# Patient Record
Sex: FEMALE | Race: WHITE | NOT HISPANIC OR LATINO
[De-identification: names, ages, dates, MRNs, and addresses within clinical notes are randomized per-mention and may not be internally consistent; named-entity substitution may affect disease eponyms.]

---

## 2017-01-23 PROBLEM — Z00.00 ENCOUNTER FOR PREVENTIVE HEALTH EXAMINATION: Status: ACTIVE | Noted: 2017-01-23

## 2017-02-02 ENCOUNTER — APPOINTMENT (OUTPATIENT)
Dept: PULMONOLOGY | Facility: CLINIC | Age: 42
End: 2017-02-02

## 2017-05-15 ENCOUNTER — APPOINTMENT (OUTPATIENT)
Dept: PULMONOLOGY | Facility: CLINIC | Age: 42
End: 2017-05-15

## 2017-09-26 ENCOUNTER — APPOINTMENT (OUTPATIENT)
Dept: PULMONOLOGY | Facility: CLINIC | Age: 42
End: 2017-09-26

## 2017-11-06 ENCOUNTER — EMERGENCY (EMERGENCY)
Facility: HOSPITAL | Age: 42
LOS: 1 days | Discharge: HOME | End: 2017-11-06
Attending: EMERGENCY MEDICINE
Payer: SELF-PAY

## 2017-11-06 VITALS
OXYGEN SATURATION: 98 % | SYSTOLIC BLOOD PRESSURE: 119 MMHG | DIASTOLIC BLOOD PRESSURE: 64 MMHG | HEART RATE: 73 BPM | TEMPERATURE: 98 F | RESPIRATION RATE: 18 BRPM

## 2017-11-06 VITALS — WEIGHT: 134.04 LBS | HEIGHT: 68 IN

## 2017-11-06 PROCEDURE — 73590 X-RAY EXAM OF LOWER LEG: CPT | Mod: 26,50

## 2017-11-06 PROCEDURE — 73564 X-RAY EXAM KNEE 4 OR MORE: CPT | Mod: 26,RT

## 2017-11-06 PROCEDURE — 73590 X-RAY EXAM OF LOWER LEG: CPT

## 2017-11-06 PROCEDURE — 99284 EMERGENCY DEPT VISIT MOD MDM: CPT | Mod: 25

## 2017-11-06 PROCEDURE — 73564 X-RAY EXAM KNEE 4 OR MORE: CPT

## 2017-11-06 PROCEDURE — 99283 EMERGENCY DEPT VISIT LOW MDM: CPT

## 2017-11-06 NOTE — ED PROVIDER NOTE - OBJECTIVE STATEMENT
42 year-old female, no significant PMHx, presents with cc right arm and left calf pain and bilateral knee pain. 42 year-old female, no significant PMHx, presents with cc right arm and left calf pain and bilateral knee pain. While at work, tripped and fell forward, breaking fall with both arms and landing on both knees. Now c/o right wrist pain, bilateral knee pain and left calf pain, aching, moderate, no alleviating factors. Denies head injury, LOC, neck/back pain, numbness/tingling, weakness, dizziness or any other complaints. Not taking any blood thinners.

## 2017-11-06 NOTE — ED PROVIDER NOTE - PROGRESS NOTE DETAILS
Xrays show no fracture or dislocation. Neurovascularly intact. Advised to take IBU and follow up with PMD in 1-2 days. Pt is well appearing walking with steady gait, stable for discharge and follow up without fail with medical doctor. I had a detailed discussion with the patient and/or guardian regarding the historical points, exam findings, and any diagnostic results supporting the discharge diagnosis. Pt educated on care and need for follow up. Strict return instructions and red flag signs and symptoms discussed with patient. Questions answered. Pt shows understanding of discharge information and agrees to follow.

## 2017-11-06 NOTE — ED PROVIDER NOTE - ATTENDING CONTRIBUTION TO CARE
Attending MD Miranda:   I personally have seen and examined this patient.  Physician assistant note reviewed and agree on plan of care and except where noted.  See MDM for details.

## 2017-11-06 NOTE — ED PROVIDER NOTE - PHYSICAL EXAMINATION
Neck full range of motion. No spinal/paraspinal tenderness to palpation. Right wrist full range of motion without bony tenderness. Neghative snuffbox. Cap. refill < 2 sec. Radial/ulnar pulses intact 2+ bilaterally. LE exam: DP/PT pulses intact 2+. Bilateral knee, hip and ankle full range of motion. No swelling or bony tenderness. Mild left calf tightness and tenderness.

## 2017-11-06 NOTE — ED ADULT NURSE NOTE - OBJECTIVE STATEMENT
AOX3 +ambulatory patient reports L leg and and right arm pain s/p fall at work. Patient denies any LOC

## 2017-11-06 NOTE — ED PROVIDER NOTE - MEDICAL DECISION MAKING DETAILS
42 year-old female, presents for evaluation s/p mechanical fall today. Well-appearing, neurovascularly intact. Plan: xray, re-assess with likely dc.

## 2017-11-20 ENCOUNTER — INPATIENT (INPATIENT)
Facility: HOSPITAL | Age: 42
LOS: 4 days | Discharge: HOME | DRG: 164 | End: 2017-11-25
Attending: STUDENT IN AN ORGANIZED HEALTH CARE EDUCATION/TRAINING PROGRAM | Admitting: STUDENT IN AN ORGANIZED HEALTH CARE EDUCATION/TRAINING PROGRAM
Payer: COMMERCIAL

## 2017-11-20 VITALS
TEMPERATURE: 98 F | SYSTOLIC BLOOD PRESSURE: 130 MMHG | DIASTOLIC BLOOD PRESSURE: 73 MMHG | HEART RATE: 82 BPM | RESPIRATION RATE: 22 BRPM | OXYGEN SATURATION: 96 %

## 2017-11-20 DIAGNOSIS — J93.9 PNEUMOTHORAX, UNSPECIFIED: ICD-10-CM

## 2017-11-20 LAB
ALBUMIN SERPL ELPH-MCNC: 4.2 G/DL — SIGNIFICANT CHANGE UP (ref 3.3–5)
ALP SERPL-CCNC: 70 U/L — SIGNIFICANT CHANGE UP (ref 40–120)
ALT FLD-CCNC: 20 U/L RC — SIGNIFICANT CHANGE UP (ref 10–45)
ANION GAP SERPL CALC-SCNC: 13 MMOL/L — SIGNIFICANT CHANGE UP (ref 5–17)
APTT BLD: 29.2 SEC — SIGNIFICANT CHANGE UP (ref 27.5–37.4)
AST SERPL-CCNC: 21 U/L — SIGNIFICANT CHANGE UP (ref 10–40)
BASOPHILS # BLD AUTO: 0.1 K/UL — SIGNIFICANT CHANGE UP (ref 0–0.2)
BASOPHILS NFR BLD AUTO: 1 % — SIGNIFICANT CHANGE UP (ref 0–2)
BILIRUB SERPL-MCNC: 0.2 MG/DL — SIGNIFICANT CHANGE UP (ref 0.2–1.2)
BUN SERPL-MCNC: 21 MG/DL — SIGNIFICANT CHANGE UP (ref 7–23)
CALCIUM SERPL-MCNC: 8.7 MG/DL — SIGNIFICANT CHANGE UP (ref 8.4–10.5)
CHLORIDE SERPL-SCNC: 105 MMOL/L — SIGNIFICANT CHANGE UP (ref 96–108)
CO2 SERPL-SCNC: 25 MMOL/L — SIGNIFICANT CHANGE UP (ref 22–31)
CREAT SERPL-MCNC: 0.65 MG/DL — SIGNIFICANT CHANGE UP (ref 0.5–1.3)
EOSINOPHIL # BLD AUTO: 0.1 K/UL — SIGNIFICANT CHANGE UP (ref 0–0.5)
EOSINOPHIL NFR BLD AUTO: 1.8 % — SIGNIFICANT CHANGE UP (ref 0–6)
GLUCOSE SERPL-MCNC: 85 MG/DL — SIGNIFICANT CHANGE UP (ref 70–99)
HCT VFR BLD CALC: 40.5 % — SIGNIFICANT CHANGE UP (ref 34.5–45)
HGB BLD-MCNC: 13.6 G/DL — SIGNIFICANT CHANGE UP (ref 11.5–15.5)
INR BLD: 0.91 RATIO — SIGNIFICANT CHANGE UP (ref 0.88–1.16)
LYMPHOCYTES # BLD AUTO: 2.3 K/UL — SIGNIFICANT CHANGE UP (ref 1–3.3)
LYMPHOCYTES # BLD AUTO: 40.5 % — SIGNIFICANT CHANGE UP (ref 13–44)
MACROCYTES BLD QL: SIGNIFICANT CHANGE UP
MCHC RBC-ENTMCNC: 33.6 GM/DL — SIGNIFICANT CHANGE UP (ref 32–36)
MCHC RBC-ENTMCNC: 35.5 PG — HIGH (ref 27–34)
MCV RBC AUTO: 106 FL — HIGH (ref 80–100)
MONOCYTES # BLD AUTO: 0.4 K/UL — SIGNIFICANT CHANGE UP (ref 0–0.9)
MONOCYTES NFR BLD AUTO: 6.5 % — SIGNIFICANT CHANGE UP (ref 2–14)
NEUTROPHILS # BLD AUTO: 2.8 K/UL — SIGNIFICANT CHANGE UP (ref 1.8–7.4)
NEUTROPHILS NFR BLD AUTO: 50.2 % — SIGNIFICANT CHANGE UP (ref 43–77)
PLAT MORPH BLD: NORMAL — SIGNIFICANT CHANGE UP
PLATELET # BLD AUTO: 181 K/UL — SIGNIFICANT CHANGE UP (ref 150–400)
POTASSIUM SERPL-MCNC: 4.3 MMOL/L — SIGNIFICANT CHANGE UP (ref 3.5–5.3)
POTASSIUM SERPL-SCNC: 4.3 MMOL/L — SIGNIFICANT CHANGE UP (ref 3.5–5.3)
PROT SERPL-MCNC: 6.3 G/DL — SIGNIFICANT CHANGE UP (ref 6–8.3)
PROTHROM AB SERPL-ACNC: 9.9 SEC — SIGNIFICANT CHANGE UP (ref 9.8–12.7)
RBC # BLD: 3.83 M/UL — SIGNIFICANT CHANGE UP (ref 3.8–5.2)
RBC # FLD: 11.3 % — SIGNIFICANT CHANGE UP (ref 10.3–14.5)
RBC BLD AUTO: ABNORMAL
SODIUM SERPL-SCNC: 143 MMOL/L — SIGNIFICANT CHANGE UP (ref 135–145)
WBC # BLD: 5.7 K/UL — SIGNIFICANT CHANGE UP (ref 3.8–10.5)
WBC # FLD AUTO: 5.7 K/UL — SIGNIFICANT CHANGE UP (ref 3.8–10.5)

## 2017-11-20 PROCEDURE — 99285 EMERGENCY DEPT VISIT HI MDM: CPT

## 2017-11-20 PROCEDURE — 99223 1ST HOSP IP/OBS HIGH 75: CPT | Mod: 57

## 2017-11-20 PROCEDURE — 71010: CPT | Mod: 26

## 2017-11-20 PROCEDURE — 71250 CT THORAX DX C-: CPT | Mod: 26

## 2017-11-20 NOTE — H&P ADULT - HISTORY OF PRESENT ILLNESS
42yF w/ PMH sig for emphysema (managed by her pulmonologist, Dr. Emma Houston) and prior spontaneous R PTX presented to [facility] ED on 11/20/2017 c/o 3 days of CP and SoB. She initially went to [facility] in [place] and had CXR that could not r/o PTX, so she was advised to go to ED for evaluation. Pt states she had her first PTX February 2016 that was treated w/ just nasal canula and did not have a chest tube placed. She states she had similar symptoms 2 other times in the past year, but did not go to ED for evaluation and treatment. She had spontaneous resolution of symptoms after approximately 1 week. She states that she feels a discomfort in her chest just lateral to her sternum that radiates to her back and is associated w/ SoB. This is how she feels every time she has a PTX.

## 2017-11-20 NOTE — H&P ADULT - ATTENDING COMMENTS
patient seen - reports previous episode of pneumothorax in right chest. she reports similar feeling two other times however she did not seek medical treatment those times. She does not recall any relation to her menses. She is a current smoker. Discussed with her blebs present on ct scan - we will resect these if feasible. we will also talc pleurodese her. Patient agrees - consent in chart. awaiting pregnancy test, right vats/blebectomy/pleurodesis

## 2017-11-20 NOTE — H&P ADULT - ASSESSMENT
42yF w/ spontaneous R PTX    - Admit to Thoracic surgery service, Dr. Brantley  - Possible add on for R VATS t/m  - NPOpMN in anticipation of OR  - Repeat CXR in AM to assess evolution of PTX  - Nasal canula and continuous pulse ox monitoring  - Plan discussed w/ Dr. Brantley  - Please call [phone] w/ any questions    FLEX Khan PGY-2

## 2017-11-20 NOTE — H&P ADULT - NSHPREVIEWOFSYSTEMS_GEN_ALL_CORE
She denies fevers, chills, weakness, numbness, tingling, palpitations, nausea, vomiting, diarrhea, constipation, dysuria, or hematuria. See HPI for positive ROS.

## 2017-11-20 NOTE — ED ADULT NURSE NOTE - CHPI ED SYMPTOMS NEG
no vomiting/no dizziness/no chills/no nausea/no weakness/no fever/no numbness/no tingling/no decreased eating/drinking

## 2017-11-20 NOTE — ED PROVIDER NOTE - MEDICAL DECISION MAKING DETAILS
41 y/o F pt with PMHx of multiple pneumothorax, emphysema, current smoker sent in by urgent care to r/o pneumothorax. Pt notes SOB and back pain after bending over. Plan: CXR, CT to evaluate for pneumothorax.

## 2017-11-20 NOTE — ED ADULT NURSE NOTE - CHIEF COMPLAINT QUOTE
hx of collapsed lung right lung collapsed after back injury happened Friday night sent in for further evaluation for sees something in the xray

## 2017-11-20 NOTE — ED ADULT NURSE NOTE - OBJECTIVE STATEMENT
42 year old female presented to ED with c/o of middle back pain. Pt was cleaning floors on Friday and when getting up had sharp middle back pain. Pt went to PMD and x-ray showed pneumothorax. Pt denies CP, SOB, nausea/vomiting, numbness/tingling, fever, cough, chills, dizziness, headache. Pt a&ox3, lung sounds clear bilaterally, heart rate regular, abdomen soft nontender nondistended to palp. Skin intact. IV in left AC 20G and patent. Pt currently resting in bed with family at bedside, side rails up for safety. Will continue to monitor and assess while offering support and reassurance.

## 2017-11-20 NOTE — H&P ADULT - NSHPPHYSICALEXAM_GEN_ALL_CORE
Gen: WD, WN, NAD  HEENT: PERRLA, EOMI, Oropharynx clear  Neck: Supple, no JVD, No thyromegaly  Lungs: Diminished breath sounds over R base  Heart: RRR, S1 S2, No m/r/g  Abd: Soft, ND, NT, No HSM, No rebound or guarding  Ext: WWP, No clubbing, cyanosis, or edema  Neuro: AAOx3, CN II-XII grossly intact, No focal deficits

## 2017-11-20 NOTE — H&P ADULT - NSHPLABSRESULTS_GEN_ALL_CORE
LABS:  CBC (11-20 @ 20:18)                              13.6                           5.7     )----------------(  181        50.2  % Neutrophils, 40.5  % Lymphocytes, ANC: 2.8                                 40.5                  BMP (11-20 @ 20:18)             143     |  105     |  21    		Ca++ --      Ca 8.7                ---------------------------------( 85    		Mg --                 4.3     |  25      |  0.65  			Ph --        LFTs (11-20 @ 20:18)      TPro 6.3 / Alb 4.2 / TBili 0.2 / DBili -- / AST 21 / ALT 20 / AlkPhos 70    Coags (11-20 @ 20:33)  aPTT 29.2 / INR 0.91 / PT 9.9        IMAGING:  < from: CT Chest No Cont (11.20.17 @ 20:13) >    IMPRESSION: Moderate right hydropneumothorax.    Emphysema with 3 mm right upper lobe nodule. One-year follow-up CT needed   for complete evaluation.    < end of copied text >

## 2017-11-20 NOTE — ED ADULT TRIAGE NOTE - CHIEF COMPLAINT QUOTE
hx of collapsed lung right lung collapsed after back injury happened Friday night today xray and lent in

## 2017-11-20 NOTE — ED PROVIDER NOTE - OBJECTIVE STATEMENT
41 y/o F pt with PMHx of emphysema sent in for further evaluation. Notes back pain and SOB s/p bending over. Pt took muscle relaxers for the pain with no relief. Pt went to urgent care today and completed XR. Pt was advised to go to the ED for pneumothorax r/o. Pt went to [facility] walk [place] [address]. Notes a pneumothorax could not be excluded on the XR.  States that she had a hx of pneumothorax last month and a few months ago as well. Pt currently follows up with pulmonologist, last visit was 2 months ago. Pt is trying to quit smoking cigarettes.

## 2017-11-21 ENCOUNTER — RESULT REVIEW (OUTPATIENT)
Age: 42
End: 2017-11-21

## 2017-11-21 ENCOUNTER — TRANSCRIPTION ENCOUNTER (OUTPATIENT)
Age: 42
End: 2017-11-21

## 2017-11-21 LAB
ANION GAP SERPL CALC-SCNC: 13 MMOL/L — SIGNIFICANT CHANGE UP (ref 5–17)
BLD GP AB SCN SERPL QL: NEGATIVE — SIGNIFICANT CHANGE UP
BUN SERPL-MCNC: 15 MG/DL — SIGNIFICANT CHANGE UP (ref 7–23)
CALCIUM SERPL-MCNC: 8.3 MG/DL — LOW (ref 8.4–10.5)
CHLORIDE SERPL-SCNC: 104 MMOL/L — SIGNIFICANT CHANGE UP (ref 96–108)
CO2 SERPL-SCNC: 23 MMOL/L — SIGNIFICANT CHANGE UP (ref 22–31)
CREAT SERPL-MCNC: 0.55 MG/DL — SIGNIFICANT CHANGE UP (ref 0.5–1.3)
GLUCOSE SERPL-MCNC: 79 MG/DL — SIGNIFICANT CHANGE UP (ref 70–99)
HCG UR QL: NEGATIVE — SIGNIFICANT CHANGE UP
HCT VFR BLD CALC: 37.8 % — SIGNIFICANT CHANGE UP (ref 34.5–45)
HGB BLD-MCNC: 12.9 G/DL — SIGNIFICANT CHANGE UP (ref 11.5–15.5)
MCHC RBC-ENTMCNC: 34 PG — SIGNIFICANT CHANGE UP (ref 27–34)
MCHC RBC-ENTMCNC: 34.1 GM/DL — SIGNIFICANT CHANGE UP (ref 32–36)
MCV RBC AUTO: 99.7 FL — SIGNIFICANT CHANGE UP (ref 80–100)
PLATELET # BLD AUTO: 182 K/UL — SIGNIFICANT CHANGE UP (ref 150–400)
POTASSIUM SERPL-MCNC: 4.2 MMOL/L — SIGNIFICANT CHANGE UP (ref 3.5–5.3)
POTASSIUM SERPL-SCNC: 4.2 MMOL/L — SIGNIFICANT CHANGE UP (ref 3.5–5.3)
RBC # BLD: 3.79 M/UL — LOW (ref 3.8–5.2)
RBC # FLD: 12.7 % — SIGNIFICANT CHANGE UP (ref 10.3–14.5)
RH IG SCN BLD-IMP: POSITIVE — SIGNIFICANT CHANGE UP
RH IG SCN BLD-IMP: POSITIVE — SIGNIFICANT CHANGE UP
SODIUM SERPL-SCNC: 140 MMOL/L — SIGNIFICANT CHANGE UP (ref 135–145)
WBC # BLD: 4.58 K/UL — SIGNIFICANT CHANGE UP (ref 3.8–10.5)
WBC # FLD AUTO: 4.58 K/UL — SIGNIFICANT CHANGE UP (ref 3.8–10.5)

## 2017-11-21 PROCEDURE — 32650 THORACOSCOPY W/PLEURODESIS: CPT | Mod: RT

## 2017-11-21 PROCEDURE — 32655 THORACOSCOPY RESECT BULLAE: CPT | Mod: RT

## 2017-11-21 PROCEDURE — 88307 TISSUE EXAM BY PATHOLOGIST: CPT | Mod: 26

## 2017-11-21 PROCEDURE — 71010: CPT | Mod: 26

## 2017-11-21 RX ORDER — HYDROMORPHONE HYDROCHLORIDE 2 MG/ML
0.5 INJECTION INTRAMUSCULAR; INTRAVENOUS; SUBCUTANEOUS
Qty: 0 | Refills: 0 | Status: DISCONTINUED | OUTPATIENT
Start: 2017-11-21 | End: 2017-11-22

## 2017-11-21 RX ORDER — HEPARIN SODIUM 5000 [USP'U]/ML
5000 INJECTION INTRAVENOUS; SUBCUTANEOUS EVERY 8 HOURS
Qty: 0 | Refills: 0 | Status: DISCONTINUED | OUTPATIENT
Start: 2017-11-21 | End: 2017-11-25

## 2017-11-21 RX ORDER — HYDROMORPHONE HYDROCHLORIDE 2 MG/ML
0.5 INJECTION INTRAMUSCULAR; INTRAVENOUS; SUBCUTANEOUS EVERY 4 HOURS
Qty: 0 | Refills: 0 | Status: DISCONTINUED | OUTPATIENT
Start: 2017-11-21 | End: 2017-11-21

## 2017-11-21 RX ORDER — BUDESONIDE AND FORMOTEROL FUMARATE DIHYDRATE 160; 4.5 UG/1; UG/1
2 AEROSOL RESPIRATORY (INHALATION)
Qty: 0 | Refills: 0 | Status: DISCONTINUED | OUTPATIENT
Start: 2017-11-21 | End: 2017-11-21

## 2017-11-21 RX ORDER — CEFAZOLIN SODIUM 1 G
2000 VIAL (EA) INJECTION EVERY 8 HOURS
Qty: 0 | Refills: 0 | Status: COMPLETED | OUTPATIENT
Start: 2017-11-21 | End: 2017-11-22

## 2017-11-21 RX ORDER — HYDROMORPHONE HYDROCHLORIDE 2 MG/ML
30 INJECTION INTRAMUSCULAR; INTRAVENOUS; SUBCUTANEOUS
Qty: 0 | Refills: 0 | Status: DISCONTINUED | OUTPATIENT
Start: 2017-11-21 | End: 2017-11-23

## 2017-11-21 RX ORDER — HYDROMORPHONE HYDROCHLORIDE 2 MG/ML
0.5 INJECTION INTRAMUSCULAR; INTRAVENOUS; SUBCUTANEOUS
Qty: 0 | Refills: 0 | Status: DISCONTINUED | OUTPATIENT
Start: 2017-11-21 | End: 2017-11-23

## 2017-11-21 RX ORDER — ONDANSETRON 8 MG/1
4 TABLET, FILM COATED ORAL EVERY 6 HOURS
Qty: 0 | Refills: 0 | Status: DISCONTINUED | OUTPATIENT
Start: 2017-11-21 | End: 2017-11-25

## 2017-11-21 RX ORDER — SODIUM CHLORIDE 9 MG/ML
1000 INJECTION, SOLUTION INTRAVENOUS
Qty: 0 | Refills: 0 | Status: DISCONTINUED | OUTPATIENT
Start: 2017-11-21 | End: 2017-11-21

## 2017-11-21 RX ORDER — ONDANSETRON 8 MG/1
4 TABLET, FILM COATED ORAL EVERY 6 HOURS
Qty: 0 | Refills: 0 | Status: DISCONTINUED | OUTPATIENT
Start: 2017-11-21 | End: 2017-11-21

## 2017-11-21 RX ORDER — ONDANSETRON 8 MG/1
4 TABLET, FILM COATED ORAL EVERY 6 HOURS
Qty: 0 | Refills: 0 | Status: DISCONTINUED | OUTPATIENT
Start: 2017-11-21 | End: 2017-11-22

## 2017-11-21 RX ORDER — DEXTROSE MONOHYDRATE, SODIUM CHLORIDE, AND POTASSIUM CHLORIDE 50; .745; 4.5 G/1000ML; G/1000ML; G/1000ML
1000 INJECTION, SOLUTION INTRAVENOUS
Qty: 0 | Refills: 0 | Status: DISCONTINUED | OUTPATIENT
Start: 2017-11-21 | End: 2017-11-22

## 2017-11-21 RX ORDER — ACETAMINOPHEN 500 MG
650 TABLET ORAL EVERY 6 HOURS
Qty: 0 | Refills: 0 | Status: DISCONTINUED | OUTPATIENT
Start: 2017-11-21 | End: 2017-11-23

## 2017-11-21 RX ORDER — NALOXONE HYDROCHLORIDE 4 MG/.1ML
0.1 SPRAY NASAL
Qty: 0 | Refills: 0 | Status: DISCONTINUED | OUTPATIENT
Start: 2017-11-21 | End: 2017-11-23

## 2017-11-21 RX ORDER — ACETAMINOPHEN 500 MG
650 TABLET ORAL EVERY 6 HOURS
Qty: 0 | Refills: 0 | Status: DISCONTINUED | OUTPATIENT
Start: 2017-11-21 | End: 2017-11-25

## 2017-11-21 RX ORDER — HEPARIN SODIUM 5000 [USP'U]/ML
5000 INJECTION INTRAVENOUS; SUBCUTANEOUS EVERY 8 HOURS
Qty: 0 | Refills: 0 | Status: DISCONTINUED | OUTPATIENT
Start: 2017-11-21 | End: 2017-11-21

## 2017-11-21 RX ORDER — ALBUTEROL 90 UG/1
2 AEROSOL, METERED ORAL EVERY 6 HOURS
Qty: 0 | Refills: 0 | Status: DISCONTINUED | OUTPATIENT
Start: 2017-11-21 | End: 2017-11-21

## 2017-11-21 RX ADMIN — HYDROMORPHONE HYDROCHLORIDE 30 MILLILITER(S): 2 INJECTION INTRAMUSCULAR; INTRAVENOUS; SUBCUTANEOUS at 15:58

## 2017-11-21 RX ADMIN — HYDROMORPHONE HYDROCHLORIDE 0.5 MILLIGRAM(S): 2 INJECTION INTRAMUSCULAR; INTRAVENOUS; SUBCUTANEOUS at 13:49

## 2017-11-21 RX ADMIN — HYDROMORPHONE HYDROCHLORIDE 0.5 MILLIGRAM(S): 2 INJECTION INTRAMUSCULAR; INTRAVENOUS; SUBCUTANEOUS at 14:05

## 2017-11-21 RX ADMIN — HYDROMORPHONE HYDROCHLORIDE 30 MILLILITER(S): 2 INJECTION INTRAMUSCULAR; INTRAVENOUS; SUBCUTANEOUS at 14:10

## 2017-11-21 RX ADMIN — SODIUM CHLORIDE 75 MILLILITER(S): 9 INJECTION, SOLUTION INTRAVENOUS at 03:46

## 2017-11-21 RX ADMIN — Medication 100 MILLIGRAM(S): at 18:47

## 2017-11-21 RX ADMIN — HYDROMORPHONE HYDROCHLORIDE 30 MILLILITER(S): 2 INJECTION INTRAMUSCULAR; INTRAVENOUS; SUBCUTANEOUS at 23:54

## 2017-11-21 RX ADMIN — HEPARIN SODIUM 5000 UNIT(S): 5000 INJECTION INTRAVENOUS; SUBCUTANEOUS at 15:05

## 2017-11-21 RX ADMIN — HEPARIN SODIUM 5000 UNIT(S): 5000 INJECTION INTRAVENOUS; SUBCUTANEOUS at 21:56

## 2017-11-21 RX ADMIN — BUDESONIDE AND FORMOTEROL FUMARATE DIHYDRATE 2 PUFF(S): 160; 4.5 AEROSOL RESPIRATORY (INHALATION) at 06:39

## 2017-11-21 RX ADMIN — HEPARIN SODIUM 5000 UNIT(S): 5000 INJECTION INTRAVENOUS; SUBCUTANEOUS at 06:05

## 2017-11-21 NOTE — PROVIDER CONTACT NOTE (OTHER) - ACTION/TREATMENT ORDERED:
MD stated that pt has to have surgery but is not on the schedule as yet and consents for OR were not signed, so MD stated she can get the hep shot this am- same given

## 2017-11-21 NOTE — PROVIDER CONTACT NOTE (OTHER) - ASSESSMENT
Patient has no diet order and is asking for food, as per patient she may be going to the OR in the AM.

## 2017-11-21 NOTE — BRIEF OPERATIVE NOTE - PROCEDURE
<<-----Click on this checkbox to enter Procedure Blebectomy, thoracoscopic, with pleurodesis  11/21/2017    Active  I5

## 2017-11-22 DIAGNOSIS — J93.9 PNEUMOTHORAX, UNSPECIFIED: ICD-10-CM

## 2017-11-22 LAB
ANION GAP SERPL CALC-SCNC: 10 MMOL/L — SIGNIFICANT CHANGE UP (ref 5–17)
BUN SERPL-MCNC: 12 MG/DL — SIGNIFICANT CHANGE UP (ref 7–23)
CALCIUM SERPL-MCNC: 7.6 MG/DL — LOW (ref 8.4–10.5)
CHLORIDE SERPL-SCNC: 105 MMOL/L — SIGNIFICANT CHANGE UP (ref 96–108)
CO2 SERPL-SCNC: 26 MMOL/L — SIGNIFICANT CHANGE UP (ref 22–31)
CREAT SERPL-MCNC: 0.59 MG/DL — SIGNIFICANT CHANGE UP (ref 0.5–1.3)
GLUCOSE SERPL-MCNC: 97 MG/DL — SIGNIFICANT CHANGE UP (ref 70–99)
HCT VFR BLD CALC: 37.9 % — SIGNIFICANT CHANGE UP (ref 34.5–45)
HGB BLD-MCNC: 12.7 G/DL — SIGNIFICANT CHANGE UP (ref 11.5–15.5)
MCHC RBC-ENTMCNC: 33.4 GM/DL — SIGNIFICANT CHANGE UP (ref 32–36)
MCHC RBC-ENTMCNC: 35.4 PG — HIGH (ref 27–34)
MCV RBC AUTO: 106 FL — HIGH (ref 80–100)
PLATELET # BLD AUTO: 166 K/UL — SIGNIFICANT CHANGE UP (ref 150–400)
POTASSIUM SERPL-MCNC: 5.2 MMOL/L — SIGNIFICANT CHANGE UP (ref 3.5–5.3)
POTASSIUM SERPL-SCNC: 5.2 MMOL/L — SIGNIFICANT CHANGE UP (ref 3.5–5.3)
RBC # BLD: 3.57 M/UL — LOW (ref 3.8–5.2)
RBC # FLD: 11.3 % — SIGNIFICANT CHANGE UP (ref 10.3–14.5)
SODIUM SERPL-SCNC: 141 MMOL/L — SIGNIFICANT CHANGE UP (ref 135–145)
WBC # BLD: 8.2 K/UL — SIGNIFICANT CHANGE UP (ref 3.8–10.5)
WBC # FLD AUTO: 8.2 K/UL — SIGNIFICANT CHANGE UP (ref 3.8–10.5)

## 2017-11-22 PROCEDURE — 71010: CPT | Mod: 26

## 2017-11-22 RX ORDER — DOCUSATE SODIUM 100 MG
100 CAPSULE ORAL THREE TIMES A DAY
Qty: 0 | Refills: 0 | Status: DISCONTINUED | OUTPATIENT
Start: 2017-11-22 | End: 2017-11-25

## 2017-11-22 RX ORDER — SODIUM CHLORIDE 9 MG/ML
1000 INJECTION INTRAMUSCULAR; INTRAVENOUS; SUBCUTANEOUS
Qty: 0 | Refills: 0 | Status: DISCONTINUED | OUTPATIENT
Start: 2017-11-22 | End: 2017-11-24

## 2017-11-22 RX ORDER — ACETAMINOPHEN 500 MG
1000 TABLET ORAL ONCE
Qty: 0 | Refills: 0 | Status: COMPLETED | OUTPATIENT
Start: 2017-11-22 | End: 2017-11-22

## 2017-11-22 RX ORDER — SENNA PLUS 8.6 MG/1
2 TABLET ORAL AT BEDTIME
Qty: 0 | Refills: 0 | Status: DISCONTINUED | OUTPATIENT
Start: 2017-11-22 | End: 2017-11-25

## 2017-11-22 RX ORDER — IPRATROPIUM/ALBUTEROL SULFATE 18-103MCG
3 AEROSOL WITH ADAPTER (GRAM) INHALATION EVERY 6 HOURS
Qty: 0 | Refills: 0 | Status: DISCONTINUED | OUTPATIENT
Start: 2017-11-22 | End: 2017-11-25

## 2017-11-22 RX ADMIN — Medication 400 MILLIGRAM(S): at 08:16

## 2017-11-22 RX ADMIN — Medication 100 MILLIGRAM(S): at 13:56

## 2017-11-22 RX ADMIN — Medication 3 MILLILITER(S): at 07:30

## 2017-11-22 RX ADMIN — Medication 100 MILLIGRAM(S): at 03:00

## 2017-11-22 RX ADMIN — HYDROMORPHONE HYDROCHLORIDE 30 MILLILITER(S): 2 INJECTION INTRAMUSCULAR; INTRAVENOUS; SUBCUTANEOUS at 19:20

## 2017-11-22 RX ADMIN — ONDANSETRON 4 MILLIGRAM(S): 8 TABLET, FILM COATED ORAL at 07:44

## 2017-11-22 RX ADMIN — SODIUM CHLORIDE 30 MILLILITER(S): 9 INJECTION INTRAMUSCULAR; INTRAVENOUS; SUBCUTANEOUS at 08:16

## 2017-11-22 RX ADMIN — HYDROMORPHONE HYDROCHLORIDE 30 MILLILITER(S): 2 INJECTION INTRAMUSCULAR; INTRAVENOUS; SUBCUTANEOUS at 14:37

## 2017-11-22 RX ADMIN — HEPARIN SODIUM 5000 UNIT(S): 5000 INJECTION INTRAVENOUS; SUBCUTANEOUS at 13:56

## 2017-11-22 RX ADMIN — HYDROMORPHONE HYDROCHLORIDE 30 MILLILITER(S): 2 INJECTION INTRAMUSCULAR; INTRAVENOUS; SUBCUTANEOUS at 08:16

## 2017-11-22 RX ADMIN — HEPARIN SODIUM 5000 UNIT(S): 5000 INJECTION INTRAVENOUS; SUBCUTANEOUS at 22:04

## 2017-11-22 RX ADMIN — HEPARIN SODIUM 5000 UNIT(S): 5000 INJECTION INTRAVENOUS; SUBCUTANEOUS at 05:51

## 2017-11-22 RX ADMIN — Medication 3 MILLILITER(S): at 17:27

## 2017-11-22 RX ADMIN — HYDROMORPHONE HYDROCHLORIDE 30 MILLILITER(S): 2 INJECTION INTRAMUSCULAR; INTRAVENOUS; SUBCUTANEOUS at 12:42

## 2017-11-22 RX ADMIN — HYDROMORPHONE HYDROCHLORIDE 30 MILLILITER(S): 2 INJECTION INTRAMUSCULAR; INTRAVENOUS; SUBCUTANEOUS at 14:05

## 2017-11-22 RX ADMIN — ONDANSETRON 4 MILLIGRAM(S): 8 TABLET, FILM COATED ORAL at 22:03

## 2017-11-22 RX ADMIN — Medication 1000 MILLIGRAM(S): at 08:58

## 2017-11-22 RX ADMIN — HYDROMORPHONE HYDROCHLORIDE 0.5 MILLIGRAM(S): 2 INJECTION INTRAMUSCULAR; INTRAVENOUS; SUBCUTANEOUS at 19:34

## 2017-11-22 RX ADMIN — Medication 3 MILLILITER(S): at 23:54

## 2017-11-22 RX ADMIN — Medication 3 MILLILITER(S): at 13:12

## 2017-11-22 NOTE — PROGRESS NOTE ADULT - SUBJECTIVE AND OBJECTIVE BOX
VITAL SIGNS    Telemetry:  NSR    Vital Signs Last 24 Hrs  T(C): 36.3 (11-22-17 @ 12:00), Max: 36.5 (11-21-17 @ 16:00)  T(F): 97.3 (11-22-17 @ 12:00), Max: 97.7 (11-21-17 @ 16:00)  HR: 79 (11-22-17 @ 12:00) (56 - 96)  BP: 95/75 (11-22-17 @ 12:00) (86/43 - 113/64)  RR: 18 (11-22-17 @ 12:00) (15 - 18)  SpO2: 93% (11-22-17 @ 12:00) (90% - 100%)           Daily         CAPILLARY BLOOD GLUCOSE              Drains:                     R Pleural  [x  ] lws                          PHYSICAL EXAM    Neurology: alert and oriented x 3, moves all extremities with no defecits  CV :  RRR  Lungs:   Rt side diminshed  Abdomen: soft, nontender, nondistended, positive bowel sounds, last bowel movement preop  Extremities:     no edema   no calf tenderness VITAL SIGNS    Telemetry:  NSR    Vital Signs Last 24 Hrs  T(C): 36.3 (11-22-17 @ 12:00), Max: 36.5 (11-21-17 @ 16:00)  T(F): 97.3 (11-22-17 @ 12:00), Max: 97.7 (11-21-17 @ 16:00)  HR: 79 (11-22-17 @ 12:00) (56 - 96)  BP: 95/75 (11-22-17 @ 12:00) (86/43 - 113/64)  RR: 18 (11-22-17 @ 12:00) (15 - 18)  SpO2: 93% (11-22-17 @ 12:00) (90% - 100%)           Drains:                     R Pleural  [x  ] lws                          PHYSICAL EXAM    Neurology: alert and oriented x 3, moves all extremities with no defecits  CV :  RRR  Lungs:   Rt side diminshed  Abdomen: soft, nontender, nondistended, positive bowel sounds, last bowel movement preop  Extremities:     no edema   no calf tenderness

## 2017-11-22 NOTE — PROGRESS NOTE ADULT - PROBLEM SELECTOR PLAN 1
s/p rt vats bleb resection and pleuradesis     PCA for pain  if xray stable and minimal drainage possible tube out thursday and home thursday

## 2017-11-22 NOTE — PROGRESS NOTE ADULT - ASSESSMENT
42 yr old smoker s/p rt vats bleb resection and pleuradesis  11/21/17 for H  recurrent PTX, current smoking  11/22 OOB to chair,   recovering PACU, PCA for pain 42 yr old smoker s/p rt vats bleb resection and  pleuradesis  11/21/17        H  recurrent PTX, current smoking  11/22 OOB to chair,   recovering PACU, PCA for pain

## 2017-11-22 NOTE — PROGRESS NOTE ADULT - SUBJECTIVE AND OBJECTIVE BOX
Day 1 of Anesthesia Pain Management Service    SUBJECTIVE: The chest tube insertion site hurts.    Pain Scale Score:	[X] Refer to charted pain scores    THERAPY:    [ ] IV PCA Morphine		[ ] 5 mg/mL	[ ] 1 mg/mL  [X] IV PCA Hydromorphone	[ ] 5 mg/mL	[X] 1 mg/mL  [ ] IV PCA Fentanyl		[ ] 50 micrograms/mL    Demand dose: 0.15 mg     Lockout: 6 minutes   Continuous Rate: 0 mg/hr  4 Hour Limit: 4 mg    MEDICATIONS  (STANDING):  ALBUTerol/ipratropium for Nebulization 3 milliLiter(s) Nebulizer every 6 hours  heparin  Injectable 5000 Unit(s) SubCutaneous every 8 hours  HYDROmorphone PCA (1 mG/mL) 30 milliLiter(s) PCA Continuous PCA Continuous  sodium chloride 0.9%. 1000 milliLiter(s) (30 mL/Hr) IV Continuous <Continuous>    MEDICATIONS  (PRN):  acetaminophen   Tablet 650 milliGRAM(s) Oral every 6 hours PRN For Temp greater than 38 C (100.4 F)  acetaminophen   Tablet. 650 milliGRAM(s) Oral every 6 hours PRN Mild Pain (1 - 3)  HYDROmorphone  Injectable 0.5 milliGRAM(s) IV Push every 10 minutes PRN Moderate Pain (4 - 6)  HYDROmorphone PCA (1 mG/mL) Rescue Clinician Bolus 0.5 milliGRAM(s) IV Push every 15 minutes PRN for Pain Scale GREATER THAN 6  naloxone Injectable 0.1 milliGRAM(s) IV Push every 3 minutes PRN For ANY of the following changes in patient status:  A. RR LESS THAN 10 breaths per minute, B. Oxygen saturation LESS THAN 90%, C. Sedation score of 6  ondansetron Injectable 4 milliGRAM(s) IV Push every 6 hours PRN Nausea and/or Vomiting  ondansetron Injectable 4 milliGRAM(s) IV Push every 6 hours PRN Nausea      OBJECTIVE:    Sedation Score:	[ X] Alert	[ ] Drowsy 	[ ] Arousable	[ ] Asleep	[ ] Unresponsive    Side Effects:	[X ] None	[ ] Nausea	[ ] Vomiting	[ ] Pruritus  		[ ] Other:    Vital Signs Last 24 Hrs  T(C): 36.1 (22 Nov 2017 06:00), Max: 36.8 (21 Nov 2017 09:38)  T(F): 97 (22 Nov 2017 06:00), Max: 98.2 (21 Nov 2017 09:38)  HR: 60 (22 Nov 2017 08:00) (56 - 96)  BP: 96/50 (22 Nov 2017 08:00) (86/43 - 130/74)  BP(mean): 71 (22 Nov 2017 08:00) (61 - 82)  RR: 18 (22 Nov 2017 08:00) (15 - 18)  SpO2: 93% (22 Nov 2017 08:00) (90% - 100%)    ASSESSMENT/ PLAN    Therapy to  be:               [X] Continued   [ ] Discontinued   [ ] Changed to PRN Analgesics    Documentation and Verification of current medications:   [X] Done	[ ] Not done, not eligible    Comments: OOB in chair. Using 1-4x/hr. Reporting chest tube insertion pain. Receiving Ofirmev IV now. Reeducated to PCA use.

## 2017-11-23 LAB
ANION GAP SERPL CALC-SCNC: 10 MMOL/L — SIGNIFICANT CHANGE UP (ref 5–17)
BUN SERPL-MCNC: 12 MG/DL — SIGNIFICANT CHANGE UP (ref 7–23)
CALCIUM SERPL-MCNC: 8.2 MG/DL — LOW (ref 8.4–10.5)
CHLORIDE SERPL-SCNC: 101 MMOL/L — SIGNIFICANT CHANGE UP (ref 96–108)
CO2 SERPL-SCNC: 25 MMOL/L — SIGNIFICANT CHANGE UP (ref 22–31)
CREAT SERPL-MCNC: 0.63 MG/DL — SIGNIFICANT CHANGE UP (ref 0.5–1.3)
GLUCOSE SERPL-MCNC: 99 MG/DL — SIGNIFICANT CHANGE UP (ref 70–99)
HCT VFR BLD CALC: 41.2 % — SIGNIFICANT CHANGE UP (ref 34.5–45)
HGB BLD-MCNC: 14 G/DL — SIGNIFICANT CHANGE UP (ref 11.5–15.5)
MCHC RBC-ENTMCNC: 33.9 GM/DL — SIGNIFICANT CHANGE UP (ref 32–36)
MCHC RBC-ENTMCNC: 35.8 PG — HIGH (ref 27–34)
MCV RBC AUTO: 106 FL — HIGH (ref 80–100)
PLATELET # BLD AUTO: 169 K/UL — SIGNIFICANT CHANGE UP (ref 150–400)
POTASSIUM SERPL-MCNC: 4.7 MMOL/L — SIGNIFICANT CHANGE UP (ref 3.5–5.3)
POTASSIUM SERPL-SCNC: 4.7 MMOL/L — SIGNIFICANT CHANGE UP (ref 3.5–5.3)
RBC # BLD: 3.91 M/UL — SIGNIFICANT CHANGE UP (ref 3.8–5.2)
RBC # FLD: 11.2 % — SIGNIFICANT CHANGE UP (ref 10.3–14.5)
SODIUM SERPL-SCNC: 136 MMOL/L — SIGNIFICANT CHANGE UP (ref 135–145)
WBC # BLD: 7.6 K/UL — SIGNIFICANT CHANGE UP (ref 3.8–10.5)
WBC # FLD AUTO: 7.6 K/UL — SIGNIFICANT CHANGE UP (ref 3.8–10.5)

## 2017-11-23 PROCEDURE — 71010: CPT | Mod: 26,76

## 2017-11-23 RX ORDER — HYDROMORPHONE HYDROCHLORIDE 2 MG/ML
2 INJECTION INTRAMUSCULAR; INTRAVENOUS; SUBCUTANEOUS
Qty: 0 | Refills: 0 | Status: DISCONTINUED | OUTPATIENT
Start: 2017-11-23 | End: 2017-11-25

## 2017-11-23 RX ORDER — ACETAMINOPHEN 500 MG
650 TABLET ORAL EVERY 6 HOURS
Qty: 0 | Refills: 0 | Status: DISCONTINUED | OUTPATIENT
Start: 2017-11-23 | End: 2017-11-23

## 2017-11-23 RX ORDER — HYDROMORPHONE HYDROCHLORIDE 2 MG/ML
4 INJECTION INTRAMUSCULAR; INTRAVENOUS; SUBCUTANEOUS
Qty: 0 | Refills: 0 | Status: DISCONTINUED | OUTPATIENT
Start: 2017-11-23 | End: 2017-11-25

## 2017-11-23 RX ADMIN — HYDROMORPHONE HYDROCHLORIDE 4 MILLIGRAM(S): 2 INJECTION INTRAMUSCULAR; INTRAVENOUS; SUBCUTANEOUS at 22:15

## 2017-11-23 RX ADMIN — Medication 3 MILLILITER(S): at 17:40

## 2017-11-23 RX ADMIN — Medication 3 MILLILITER(S): at 11:14

## 2017-11-23 RX ADMIN — HYDROMORPHONE HYDROCHLORIDE 30 MILLILITER(S): 2 INJECTION INTRAMUSCULAR; INTRAVENOUS; SUBCUTANEOUS at 07:20

## 2017-11-23 RX ADMIN — HEPARIN SODIUM 5000 UNIT(S): 5000 INJECTION INTRAVENOUS; SUBCUTANEOUS at 05:17

## 2017-11-23 RX ADMIN — Medication 3 MILLILITER(S): at 23:03

## 2017-11-23 RX ADMIN — HYDROMORPHONE HYDROCHLORIDE 4 MILLIGRAM(S): 2 INJECTION INTRAMUSCULAR; INTRAVENOUS; SUBCUTANEOUS at 21:42

## 2017-11-23 RX ADMIN — Medication 3 MILLILITER(S): at 05:17

## 2017-11-23 RX ADMIN — HYDROMORPHONE HYDROCHLORIDE 2 MILLIGRAM(S): 2 INJECTION INTRAMUSCULAR; INTRAVENOUS; SUBCUTANEOUS at 18:15

## 2017-11-23 RX ADMIN — SENNA PLUS 2 TABLET(S): 8.6 TABLET ORAL at 21:41

## 2017-11-23 RX ADMIN — Medication 100 MILLIGRAM(S): at 14:37

## 2017-11-23 RX ADMIN — HEPARIN SODIUM 5000 UNIT(S): 5000 INJECTION INTRAVENOUS; SUBCUTANEOUS at 21:41

## 2017-11-23 RX ADMIN — Medication 100 MILLIGRAM(S): at 05:17

## 2017-11-23 RX ADMIN — HEPARIN SODIUM 5000 UNIT(S): 5000 INJECTION INTRAVENOUS; SUBCUTANEOUS at 14:37

## 2017-11-23 RX ADMIN — HYDROMORPHONE HYDROCHLORIDE 2 MILLIGRAM(S): 2 INJECTION INTRAMUSCULAR; INTRAVENOUS; SUBCUTANEOUS at 17:42

## 2017-11-23 RX ADMIN — Medication 100 MILLIGRAM(S): at 21:41

## 2017-11-23 NOTE — PROGRESS NOTE ADULT - SUBJECTIVE AND OBJECTIVE BOX
Subjective: Pt states" " Denies any CP, SOB, palpitations. No acute events overnight.    Vital Signs:  Vital Signs Last 24 Hrs  T(C): 37.1 (11-23-17 @ 05:00), Max: 37.1 (11-22-17 @ 14:47)  T(F): 98.7 (11-23-17 @ 05:00), Max: 98.8 (11-22-17 @ 14:47)  HR: 81 (11-23-17 @ 05:00) (75 - 81)  BP: 120/83 (11-23-17 @ 05:00) (95/75 - 127/72)  RR: 18 (11-23-17 @ 05:00) (18 - 18)  SpO2: 93% (11-23-17 @ 05:00) (90% - 93%) on (O2)        Relevant labs, radiology and Medications reviewed                        14.0   7.6   )-----------( 169      ( 23 Nov 2017 05:29 )             41.2     11-23    136  |  101  |  12  ----------------------------<  99  4.7   |  25  |  0.63    Ca    8.2<L>      23 Nov 2017 05:29        MEDICATIONS  (STANDING):  ALBUTerol/ipratropium for Nebulization 3 milliLiter(s) Nebulizer every 6 hours  docusate sodium 100 milliGRAM(s) Oral three times a day  heparin  Injectable 5000 Unit(s) SubCutaneous every 8 hours  HYDROmorphone PCA (1 mG/mL) 30 milliLiter(s) PCA Continuous PCA Continuous  senna 2 Tablet(s) Oral at bedtime  sodium chloride 0.9%. 1000 milliLiter(s) (30 mL/Hr) IV Continuous <Continuous>    MEDICATIONS  (PRN):  acetaminophen   Tablet 650 milliGRAM(s) Oral every 6 hours PRN For Temp greater than 38 C (100.4 F)  acetaminophen   Tablet. 650 milliGRAM(s) Oral every 6 hours PRN Mild Pain (1 - 3)  HYDROmorphone PCA (1 mG/mL) Rescue Clinician Bolus 0.5 milliGRAM(s) IV Push every 15 minutes PRN for Pain Scale GREATER THAN 6  naloxone Injectable 0.1 milliGRAM(s) IV Push every 3 minutes PRN For ANY of the following changes in patient status:  A. RR LESS THAN 10 breaths per minute, B. Oxygen saturation LESS THAN 90%, C. Sedation score of 6  ondansetron Injectable 4 milliGRAM(s) IV Push every 6 hours PRN Nausea      I&O's Summary    22 Nov 2017 07:01  -  23 Nov 2017 07:00  --------------------------------------------------------  IN: 1550 mL / OUT: 455 mL / NET: 1095 mL    23 Nov 2017 07:01  -  23 Nov 2017 09:57  --------------------------------------------------------  IN: 240 mL / OUT: 500 mL / NET: -260 mL        IMAGING    CXR: no ptx    CT Chest:    PAST MEDICAL & SURGICAL HISTORY:  No significant past surgical history       Physical Exam:  Neurology: A&Ox3, nonfocal, ELI x 4, NAD  Respiratory: B/L BS CTA, diminished at bases, No wheezing, rales, rhonchi  CV: RRR, S1S2

## 2017-11-23 NOTE — PROGRESS NOTE ADULT - ASSESSMENT
42 yr old smoker s/p rt vats bleb resection and  pleuradesis  11/21/17        H  recurrent PTX, current smoking  11/22 OOB to chair,   recovering PACU, PCA for pain

## 2017-11-23 NOTE — PROGRESS NOTE ADULT - PROBLEM SELECTOR PLAN 1
s/p rt vats bleb resection and pleuradesis     PCA for pain  chest tube to water seal this am them repeat cxr after 4 hours on water seal. If ok then d/c chest tube and repeat cxr. If ok patient may be discharged to home. Plan d/w Dr. Brantley

## 2017-11-23 NOTE — PHYSICAL THERAPY INITIAL EVALUATION ADULT - PERTINENT HX OF CURRENT PROBLEM, REHAB EVAL
Pt. 42 year old female admitted 11-20-17 with right chest pain and shortness of breath.  Pt. with history of recurrent Ptx.  Pt. to OR 11-21-17 for right VATS, right upper lobe blebectomy, pleurodesis

## 2017-11-23 NOTE — CHART NOTE - NSCHARTNOTEFT_GEN_A_CORE
Day 3 of Anesthesia Pain Management Service    SUBJECTIVE: Patient is doing well with IV PCA, will continue current regimen    Pain Scale Score:	[X] Refer to charted pain scores    THERAPY:    [ ] IV PCA Morphine		[ ] 5 mg/mL	[ ] 1 mg/mL  [X] IV PCA Hydromorphone	[ ] 5 mg/mL	[X] 1 mg/mL  [ ] IV PCA Fentanyl		[ ] 50 micrograms/mL    Demand dose: 0.15 mg     Lockout: 6 minutes   Continuous Rate: 0 mg/hr  4 Hour Limit: 4 mg    MEDICATIONS  (STANDING):  ALBUTerol/ipratropium for Nebulization 3 milliLiter(s) Nebulizer every 6 hours  docusate sodium 100 milliGRAM(s) Oral three times a day  heparin  Injectable 5000 Unit(s) SubCutaneous every 8 hours  HYDROmorphone PCA (1 mG/mL) 30 milliLiter(s) PCA Continuous PCA Continuous  senna 2 Tablet(s) Oral at bedtime  sodium chloride 0.9%. 1000 milliLiter(s) (30 mL/Hr) IV Continuous <Continuous>    MEDICATIONS  (PRN):  acetaminophen   Tablet 650 milliGRAM(s) Oral every 6 hours PRN For Temp greater than 38 C (100.4 F)  acetaminophen   Tablet. 650 milliGRAM(s) Oral every 6 hours PRN Mild Pain (1 - 3)  HYDROmorphone PCA (1 mG/mL) Rescue Clinician Bolus 0.5 milliGRAM(s) IV Push every 15 minutes PRN for Pain Scale GREATER THAN 6  naloxone Injectable 0.1 milliGRAM(s) IV Push every 3 minutes PRN For ANY of the following changes in patient status:  A. RR LESS THAN 10 breaths per minute, B. Oxygen saturation LESS THAN 90%, C. Sedation score of 6  ondansetron Injectable 4 milliGRAM(s) IV Push every 6 hours PRN Nausea      OBJECTIVE:    Sedation Score:	[ X] Alert	[ ] Drowsy 	[ ] Arousable	[ ] Asleep	[ ] Unresponsive    Side Effects:	[X ] None	[ ] Nausea	[ ] Vomiting	[ ] Pruritus  		[ ] Other:    Vital Signs Last 24 Hrs  T(C): 37.1 (23 Nov 2017 05:00), Max: 37.1 (22 Nov 2017 14:47)  T(F): 98.7 (23 Nov 2017 05:00), Max: 98.8 (22 Nov 2017 14:47)  HR: 82 (23 Nov 2017 10:50) (75 - 82)  BP: 120/74 (23 Nov 2017 10:50) (95/75 - 127/72)  BP(mean): 81 (22 Nov 2017 12:00) (81 - 81)  RR: 18 (23 Nov 2017 05:00) (18 - 18)  SpO2: 93% (23 Nov 2017 10:50) (90% - 93%)    ASSESSMENT/ PLAN    Therapy to  be:               [X] Continued   [ ] Discontinued   [ ] Changed to PRN Analgesics    Documentation and Verification of current medications:   [X] Done	[ ] Not done, not eligible    Comments:

## 2017-11-24 ENCOUNTER — TRANSCRIPTION ENCOUNTER (OUTPATIENT)
Age: 42
End: 2017-11-24

## 2017-11-24 LAB
ANION GAP SERPL CALC-SCNC: 9 MMOL/L — SIGNIFICANT CHANGE UP (ref 5–17)
BUN SERPL-MCNC: 12 MG/DL — SIGNIFICANT CHANGE UP (ref 7–23)
CALCIUM SERPL-MCNC: 8.7 MG/DL — SIGNIFICANT CHANGE UP (ref 8.4–10.5)
CHLORIDE SERPL-SCNC: 101 MMOL/L — SIGNIFICANT CHANGE UP (ref 96–108)
CO2 SERPL-SCNC: 29 MMOL/L — SIGNIFICANT CHANGE UP (ref 22–31)
CREAT SERPL-MCNC: 0.54 MG/DL — SIGNIFICANT CHANGE UP (ref 0.5–1.3)
GLUCOSE SERPL-MCNC: 91 MG/DL — SIGNIFICANT CHANGE UP (ref 70–99)
HCT VFR BLD CALC: 41.5 % — SIGNIFICANT CHANGE UP (ref 34.5–45)
HGB BLD-MCNC: 14 G/DL — SIGNIFICANT CHANGE UP (ref 11.5–15.5)
MCHC RBC-ENTMCNC: 33.7 GM/DL — SIGNIFICANT CHANGE UP (ref 32–36)
MCHC RBC-ENTMCNC: 35.6 PG — HIGH (ref 27–34)
MCV RBC AUTO: 106 FL — HIGH (ref 80–100)
PLATELET # BLD AUTO: 156 K/UL — SIGNIFICANT CHANGE UP (ref 150–400)
POTASSIUM SERPL-MCNC: 4.6 MMOL/L — SIGNIFICANT CHANGE UP (ref 3.5–5.3)
POTASSIUM SERPL-SCNC: 4.6 MMOL/L — SIGNIFICANT CHANGE UP (ref 3.5–5.3)
RBC # BLD: 3.93 M/UL — SIGNIFICANT CHANGE UP (ref 3.8–5.2)
RBC # FLD: 11.2 % — SIGNIFICANT CHANGE UP (ref 10.3–14.5)
SODIUM SERPL-SCNC: 139 MMOL/L — SIGNIFICANT CHANGE UP (ref 135–145)
WBC # BLD: 6.2 K/UL — SIGNIFICANT CHANGE UP (ref 3.8–10.5)
WBC # FLD AUTO: 6.2 K/UL — SIGNIFICANT CHANGE UP (ref 3.8–10.5)

## 2017-11-24 PROCEDURE — 71010: CPT | Mod: 26

## 2017-11-24 PROCEDURE — 99231 SBSQ HOSP IP/OBS SF/LOW 25: CPT

## 2017-11-24 RX ORDER — FUROSEMIDE 40 MG
20 TABLET ORAL DAILY
Qty: 0 | Refills: 0 | Status: DISCONTINUED | OUTPATIENT
Start: 2017-11-25 | End: 2017-11-25

## 2017-11-24 RX ORDER — FUROSEMIDE 40 MG
20 TABLET ORAL ONCE
Qty: 0 | Refills: 0 | Status: COMPLETED | OUTPATIENT
Start: 2017-11-24 | End: 2017-11-24

## 2017-11-24 RX ADMIN — HYDROMORPHONE HYDROCHLORIDE 4 MILLIGRAM(S): 2 INJECTION INTRAMUSCULAR; INTRAVENOUS; SUBCUTANEOUS at 05:27

## 2017-11-24 RX ADMIN — Medication 100 MILLIGRAM(S): at 14:58

## 2017-11-24 RX ADMIN — HEPARIN SODIUM 5000 UNIT(S): 5000 INJECTION INTRAVENOUS; SUBCUTANEOUS at 14:58

## 2017-11-24 RX ADMIN — HYDROMORPHONE HYDROCHLORIDE 4 MILLIGRAM(S): 2 INJECTION INTRAMUSCULAR; INTRAVENOUS; SUBCUTANEOUS at 17:20

## 2017-11-24 RX ADMIN — Medication 20 MILLIGRAM(S): at 08:52

## 2017-11-24 RX ADMIN — HYDROMORPHONE HYDROCHLORIDE 4 MILLIGRAM(S): 2 INJECTION INTRAMUSCULAR; INTRAVENOUS; SUBCUTANEOUS at 01:45

## 2017-11-24 RX ADMIN — Medication 3 MILLILITER(S): at 11:21

## 2017-11-24 RX ADMIN — HYDROMORPHONE HYDROCHLORIDE 4 MILLIGRAM(S): 2 INJECTION INTRAMUSCULAR; INTRAVENOUS; SUBCUTANEOUS at 02:15

## 2017-11-24 RX ADMIN — HEPARIN SODIUM 5000 UNIT(S): 5000 INJECTION INTRAVENOUS; SUBCUTANEOUS at 22:13

## 2017-11-24 RX ADMIN — Medication 3 MILLILITER(S): at 05:27

## 2017-11-24 RX ADMIN — Medication 100 MILLIGRAM(S): at 05:27

## 2017-11-24 RX ADMIN — HYDROMORPHONE HYDROCHLORIDE 4 MILLIGRAM(S): 2 INJECTION INTRAMUSCULAR; INTRAVENOUS; SUBCUTANEOUS at 11:20

## 2017-11-24 RX ADMIN — HYDROMORPHONE HYDROCHLORIDE 4 MILLIGRAM(S): 2 INJECTION INTRAMUSCULAR; INTRAVENOUS; SUBCUTANEOUS at 06:03

## 2017-11-24 RX ADMIN — Medication 100 MILLIGRAM(S): at 22:13

## 2017-11-24 RX ADMIN — HYDROMORPHONE HYDROCHLORIDE 4 MILLIGRAM(S): 2 INJECTION INTRAMUSCULAR; INTRAVENOUS; SUBCUTANEOUS at 12:00

## 2017-11-24 RX ADMIN — Medication 3 MILLILITER(S): at 23:54

## 2017-11-24 RX ADMIN — HYDROMORPHONE HYDROCHLORIDE 4 MILLIGRAM(S): 2 INJECTION INTRAMUSCULAR; INTRAVENOUS; SUBCUTANEOUS at 17:50

## 2017-11-24 RX ADMIN — HEPARIN SODIUM 5000 UNIT(S): 5000 INJECTION INTRAVENOUS; SUBCUTANEOUS at 05:27

## 2017-11-24 RX ADMIN — SENNA PLUS 2 TABLET(S): 8.6 TABLET ORAL at 22:13

## 2017-11-24 NOTE — DISCHARGE NOTE ADULT - PATIENT PORTAL LINK FT
“You can access the [employer] Patient Portal, offered by [facility], by registering with the following website: [email or web address]”

## 2017-11-24 NOTE — DISCHARGE NOTE ADULT - MEDICATION SUMMARY - MEDICATIONS TO TAKE
I will START or STAY ON the medications listed below when I get home from the hospital:    acetaminophen 325 mg oral tablet  -- 2 tab(s) by mouth every 6 hours, As needed, Mild Pain (1 - 3)  -- Indication: For Pain    HYDROmorphone 2 mg oral tablet  -- 1 tab(s) by mouth every 4 hours, As Needed -Moderate Pain (4 - 6) take one-two tabs PRN q4 hrs fro pain MDD:6  -- Indication: For Pain    ipratropium-albuterol 0.5 mg-2.5 mg/3 mLinhalation solution  -- 3 milliliter(s) inhaled every 6 hours  -- Indication: For breathing    furosemide 20 mg oral tablet  -- 1 tab(s) by mouth once a day  -- Indication: For water pill    senna oral tablet  -- 2 tab(s) by mouth once a day (at bedtime)  -- Indication: For laxative    docusate sodium 100 mg oral capsule  -- 1 cap(s) by mouth 3 times a day  -- Indication: For stool stoftner    K-Tab 10 mEq oral tablet, extended release  -- 1 tab(s) by mouth once a day   -- It is very important that you take or use this exactly as directed.  Do not skip doses or discontinue unless directed by your doctor.  Medication should be taken with plenty of water.  Take with food or milk.    -- Indication: For vitamin

## 2017-11-24 NOTE — PROGRESS NOTE ADULT - SUBJECTIVE AND OBJECTIVE BOX
Subjective  " Hell " feel a little better     VITAL SIGNS    Telemetry:  NSR    Vital Signs Last 24 Hrs  T(C): 36.6 (11-24-17 @ 04:56), Max: 37 (11-23-17 @ 20:03)  T(F): 97.9 (11-24-17 @ 04:56), Max: 98.6 (11-23-17 @ 20:03)  HR: 78 (11-24-17 @ 04:56) (78 - 89)  BP: 135/84 (11-24-17 @ 04:56) (120/74 - 135/84)  RR: 17 (11-24-17 @ 04:56) (17 - 18)  SpO2: 94% (11-24-17 @ 04:56) (93% - 97%)             11-23 @ 07:01  -  11-24 @ 07:00  --------------------------------------------------------  IN: 680 mL / OUT: 800 mL / NET: -120 mL  MEDICATIONS  (STANDING):  ALBUTerol/ipratropium for Nebulization 3 milliLiter(s) Nebulizer every 6 hours  docusate sodium 100 milliGRAM(s) Oral three times a day  furosemide   Injectable 20 milliGRAM(s) IV Push once  heparin  Injectable 5000 Unit(s) SubCutaneous every 8 hours  senna 2 Tablet(s) Oral at bedtime  sodium chloride 0.9%. 1000 milliLiter(s) (30 mL/Hr) IV Continuous <Continuous>    MEDICATIONS  (PRN):  acetaminophen   Tablet. 650 milliGRAM(s) Oral every 6 hours PRN Mild Pain (1 - 3)  HYDROmorphone   Tablet 2 milliGRAM(s) Oral every 3 hours PRN Moderate Pain (4 - 6)  HYDROmorphone   Tablet 4 milliGRAM(s) Oral every 3 hours PRN Severe Pain (7 - 10)  ondansetron Injectable 4 milliGRAM(s) IV Push every 6 hours PRN Tunde                        PHYSICAL EXAM        Neurology: alert and oriented x 3, nonfocal, no gross deficits    CV :S1 S2 RRR     RT Vats site  dressing CDI     Lungs: b/l breath sound diminished in bases on 3l nC    Abdomen: soft, nontender, nondistended, positive bowel sounds, last bowel movement     :  voiding            Extremities:  b/l + DP warm well perfused denies calf tenderness         Physical Therapy Rec:   Home  [ x ]       Discussed with Cardiothoracic Team at AM rounds. Subjective  " Hello " feel a little better     VITAL SIGNS    Telemetry:  NSR    Vital Signs Last 24 Hrs  T(C): 36.6 (11-24-17 @ 04:56), Max: 37 (11-23-17 @ 20:03)  T(F): 97.9 (11-24-17 @ 04:56), Max: 98.6 (11-23-17 @ 20:03)  HR: 78 (11-24-17 @ 04:56) (78 - 89)  BP: 135/84 (11-24-17 @ 04:56) (120/74 - 135/84)  RR: 17 (11-24-17 @ 04:56) (17 - 18)  SpO2: 94% (11-24-17 @ 04:56) (93% - 97%)             11-23 @ 07:01  -  11-24 @ 07:00  --------------------------------------------------------  IN: 680 mL / OUT: 800 mL / NET: -120 mL  MEDICATIONS  (STANDING):  ALBUTerol/ipratropium for Nebulization 3 milliLiter(s) Nebulizer every 6 hours  docusate sodium 100 milliGRAM(s) Oral three times a day  furosemide   Injectable 20 milliGRAM(s) IV Push once  heparin  Injectable 5000 Unit(s) SubCutaneous every 8 hours  senna 2 Tablet(s) Oral at bedtime      MEDICATIONS  (PRN):  acetaminophen   Tablet. 650 milliGRAM(s) Oral every 6 hours PRN Mild Pain (1 - 3)  HYDROmorphone   Tablet 2 milliGRAM(s) Oral every 3 hours PRN Moderate Pain (4 - 6)  HYDROmorphone   Tablet 4 milliGRAM(s) Oral every 3 hours PRN Severe Pain (7 - 10)  ondansetron Injectable 4 milliGRAM(s) IV Push every 6 hours PRN Tunde                        PHYSICAL EXAM        Neurology: alert and oriented x 3, nonfocal, no gross deficits    CV :S1 S2 RRR     RT Vats site  dressing CDI     Lungs: b/l breath sound diminished in bases on 3l nC    Abdomen: soft, nontender, nondistended, positive bowel sounds, last bowel movement     :  voiding            Extremities:  b/l + DP warm well perfused denies calf tenderness         Physical Therapy Rec:   Home  [ x ]       Discussed with Cardiothoracic Team at AM rounds.

## 2017-11-24 NOTE — DISCHARGE NOTE ADULT - HOSPITAL COURSE
This is a 43y/o female PMH  current  smoker, recurrent PTX  s/p rt vats bleb resection and  pleurodesis  11/21/17 11/22 OOB to chair, recovering PACU, PCA for pain  11/23 Chest tube- water seal  then  D/C no PTX  PCA D/C   11/24 requiring supplemental oxygenation 3LNC in place desaturates to 88% on room air - d/w attending  consider home oxygen. Chest xray in am. Chest tube  site weeping serous fluid  dressing change PRN.  Patient amenable  to home oxygen.  11/25 patient stable oxygen delivered to bedside.

## 2017-11-24 NOTE — PROGRESS NOTE ADULT - ASSESSMENT
This is a 41y/o female PMH  current  smoker, recurrent PTX  s/p rt vats bleb resection and  pleuradesis  11/21/17 11/22 OOB to chair,   recovering PACU, PCA for pain  11/23 Chest tube- water seal  then  D/C no PTX  PCA D/C   11/24 requiring supplemental oxygenation 3LNC in place desatures to 88% on room air - d/w attending  consider home oxygen. Chest xray in am This is a 43y/o female PMH  current  smoker, recurrent PTX  s/p rt vats bleb resection and  pleuradesis  11/21/17 11/22 OOB to chair,   recovering PACU, PCA for pain  11/23 Chest tube- water seal  then  D/C no PTX  PCA D/C   11/24 requiring supplemental oxygenation 3LNC in place desatures to 88% on room air - d/w attending  consider home oxygen. Chest xray in am. Amenable to home oxygen. This is a 43y/o female PMH  current  smoker, recurrent PTX  s/p rt vats bleb resection and  pleuradesis  11/21/17 11/22 OOB to chair,   recovering PACU, PCA for pain  11/23 Chest tube- water seal  then  D/C no PTX  PCA D/C   11/24 requiring supplemental oxygenation 3LNC in place desatures to 88% on room air - d/w attending  consider home oxygen. Chest xray in am. Patient amenable  to home oxygen.

## 2017-11-24 NOTE — DISCHARGE NOTE ADULT - CARE PROVIDER_API CALL
Tisha Brantley), Surgery  [id number] [address] [place] [address]  Phone: [phone]  Fax: [phone] -2D echo noted without signs of tamponade  -would be cautious with fluid administration  -patient states that someone had discussed possibility of pericardiocentesis

## 2017-11-24 NOTE — DISCHARGE NOTE ADULT - ADDITIONAL INSTRUCTIONS
Follow up with  Dr Brantley within two weeks of discharge call for appointment [phone]  take all medications as prescribed  Follow up with your PCP call for appointment  Call our office [phone] for fever chills or any concerns   supplemental  oxygen as needed   change dressing on surgical site  when moist as needed - dry clean gauze  no driving while on pain medications

## 2017-11-24 NOTE — DISCHARGE NOTE ADULT - PLAN OF CARE
full recovery Follow up with  Dr Brantley within two weeks of discharge call for appointment [phone]  take all medications as prescribed  Follow up with your PCP call for appointment  Call our office [phone] for fever chills or any concerns   supplemental  oxygen as needed   change dressing on surgical site  when moist as needed - dry clean gauze  no driving while on pain medications recovery Follow up with  Dr Brantley within two weeks of discharge call for appointment [phone]  take all medications as prescribed  Follow up with your PCP call for appointment  Call our office [phone] for fever chills or any concerns   supplemental  oxygen as needed   change dressing on surgical site  when moist as needed - dry clean gauze  no driving while on pain medications

## 2017-11-24 NOTE — DISCHARGE NOTE ADULT - OTHER SIGNIFICANT FINDINGS
Axox3 NSR S1 S2 RRR  B/L breath sounds   supplemental O2 2lnc  absoft nontender + BS  voding  LE + DP warm well perfused - calf [id number] 77 18 98 (2Lnc) 36.7

## 2017-11-25 VITALS
OXYGEN SATURATION: 93 % | RESPIRATION RATE: 18 BRPM | SYSTOLIC BLOOD PRESSURE: 119 MMHG | TEMPERATURE: 98 F | HEART RATE: 79 BPM | DIASTOLIC BLOOD PRESSURE: 72 MMHG

## 2017-11-25 LAB
ANION GAP SERPL CALC-SCNC: 11 MMOL/L — SIGNIFICANT CHANGE UP (ref 5–17)
BUN SERPL-MCNC: 9 MG/DL — SIGNIFICANT CHANGE UP (ref 7–23)
CALCIUM SERPL-MCNC: 8.6 MG/DL — SIGNIFICANT CHANGE UP (ref 8.4–10.5)
CHLORIDE SERPL-SCNC: 100 MMOL/L — SIGNIFICANT CHANGE UP (ref 96–108)
CO2 SERPL-SCNC: 27 MMOL/L — SIGNIFICANT CHANGE UP (ref 22–31)
CREAT SERPL-MCNC: 0.52 MG/DL — SIGNIFICANT CHANGE UP (ref 0.5–1.3)
GLUCOSE SERPL-MCNC: 94 MG/DL — SIGNIFICANT CHANGE UP (ref 70–99)
HCT VFR BLD CALC: 40.6 % — SIGNIFICANT CHANGE UP (ref 34.5–45)
HGB BLD-MCNC: 13.9 G/DL — SIGNIFICANT CHANGE UP (ref 11.5–15.5)
MCHC RBC-ENTMCNC: 34.2 GM/DL — SIGNIFICANT CHANGE UP (ref 32–36)
MCHC RBC-ENTMCNC: 35.9 PG — HIGH (ref 27–34)
MCV RBC AUTO: 105 FL — HIGH (ref 80–100)
PLATELET # BLD AUTO: 156 K/UL — SIGNIFICANT CHANGE UP (ref 150–400)
POTASSIUM SERPL-MCNC: 4.4 MMOL/L — SIGNIFICANT CHANGE UP (ref 3.5–5.3)
POTASSIUM SERPL-SCNC: 4.4 MMOL/L — SIGNIFICANT CHANGE UP (ref 3.5–5.3)
RBC # BLD: 3.87 M/UL — SIGNIFICANT CHANGE UP (ref 3.8–5.2)
RBC # FLD: 11.3 % — SIGNIFICANT CHANGE UP (ref 10.3–14.5)
SODIUM SERPL-SCNC: 138 MMOL/L — SIGNIFICANT CHANGE UP (ref 135–145)
WBC # BLD: 5.7 K/UL — SIGNIFICANT CHANGE UP (ref 3.8–10.5)
WBC # FLD AUTO: 5.7 K/UL — SIGNIFICANT CHANGE UP (ref 3.8–10.5)

## 2017-11-25 PROCEDURE — 71250 CT THORAX DX C-: CPT

## 2017-11-25 PROCEDURE — 71045 X-RAY EXAM CHEST 1 VIEW: CPT

## 2017-11-25 PROCEDURE — 97530 THERAPEUTIC ACTIVITIES: CPT

## 2017-11-25 PROCEDURE — 85027 COMPLETE CBC AUTOMATED: CPT

## 2017-11-25 PROCEDURE — 81025 URINE PREGNANCY TEST: CPT

## 2017-11-25 PROCEDURE — 80053 COMPREHEN METABOLIC PANEL: CPT

## 2017-11-25 PROCEDURE — 85730 THROMBOPLASTIN TIME PARTIAL: CPT

## 2017-11-25 PROCEDURE — 88307 TISSUE EXAM BY PATHOLOGIST: CPT

## 2017-11-25 PROCEDURE — 86901 BLOOD TYPING SEROLOGIC RH(D): CPT

## 2017-11-25 PROCEDURE — 97116 GAIT TRAINING THERAPY: CPT

## 2017-11-25 PROCEDURE — 80048 BASIC METABOLIC PNL TOTAL CA: CPT

## 2017-11-25 PROCEDURE — 86900 BLOOD TYPING SEROLOGIC ABO: CPT

## 2017-11-25 PROCEDURE — 86850 RBC ANTIBODY SCREEN: CPT

## 2017-11-25 PROCEDURE — 97161 PT EVAL LOW COMPLEX 20 MIN: CPT

## 2017-11-25 PROCEDURE — 85610 PROTHROMBIN TIME: CPT

## 2017-11-25 PROCEDURE — C1889: CPT

## 2017-11-25 PROCEDURE — 71010: CPT | Mod: 26

## 2017-11-25 PROCEDURE — 86922 COMPATIBILITY TEST ANTIGLOB: CPT

## 2017-11-25 PROCEDURE — 99285 EMERGENCY DEPT VISIT HI MDM: CPT | Mod: 25

## 2017-11-25 PROCEDURE — 99239 HOSP IP/OBS DSCHRG MGMT >30: CPT

## 2017-11-25 PROCEDURE — 94640 AIRWAY INHALATION TREATMENT: CPT

## 2017-11-25 RX ORDER — FUROSEMIDE 40 MG
1 TABLET ORAL
Qty: 7 | Refills: 0 | OUTPATIENT
Start: 2017-11-25 | End: 2017-12-02

## 2017-11-25 RX ORDER — SENNA PLUS 8.6 MG/1
2 TABLET ORAL
Qty: 40 | Refills: 0 | OUTPATIENT
Start: 2017-11-25 | End: 2017-12-15

## 2017-11-25 RX ORDER — ACETAMINOPHEN 500 MG
2 TABLET ORAL
Qty: 160 | Refills: 0 | OUTPATIENT
Start: 2017-11-25 | End: 2017-12-15

## 2017-11-25 RX ORDER — IPRATROPIUM/ALBUTEROL SULFATE 18-103MCG
3 AEROSOL WITH ADAPTER (GRAM) INHALATION
Qty: 3 | Refills: 0 | OUTPATIENT
Start: 2017-11-25 | End: 2017-12-15

## 2017-11-25 RX ORDER — POTASSIUM CHLORIDE 20 MEQ
1 PACKET (EA) ORAL
Qty: 7 | Refills: 0 | OUTPATIENT
Start: 2017-11-25 | End: 2017-12-02

## 2017-11-25 RX ORDER — DOCUSATE SODIUM 100 MG
1 CAPSULE ORAL
Qty: 60 | Refills: 0 | OUTPATIENT
Start: 2017-11-25 | End: 2017-12-15

## 2017-11-25 RX ORDER — HYDROMORPHONE HYDROCHLORIDE 2 MG/ML
1 INJECTION INTRAMUSCULAR; INTRAVENOUS; SUBCUTANEOUS
Qty: 30 | Refills: 0 | OUTPATIENT
Start: 2017-11-25 | End: 2017-11-30

## 2017-11-25 RX ADMIN — Medication 100 MILLIGRAM(S): at 05:23

## 2017-11-25 RX ADMIN — HYDROMORPHONE HYDROCHLORIDE 4 MILLIGRAM(S): 2 INJECTION INTRAMUSCULAR; INTRAVENOUS; SUBCUTANEOUS at 03:05

## 2017-11-25 RX ADMIN — HYDROMORPHONE HYDROCHLORIDE 4 MILLIGRAM(S): 2 INJECTION INTRAMUSCULAR; INTRAVENOUS; SUBCUTANEOUS at 02:36

## 2017-11-25 RX ADMIN — HEPARIN SODIUM 5000 UNIT(S): 5000 INJECTION INTRAVENOUS; SUBCUTANEOUS at 13:07

## 2017-11-25 RX ADMIN — Medication 3 MILLILITER(S): at 12:25

## 2017-11-25 RX ADMIN — Medication 100 MILLIGRAM(S): at 13:07

## 2017-11-25 RX ADMIN — Medication 3 MILLILITER(S): at 06:40

## 2017-11-25 RX ADMIN — Medication 20 MILLIGRAM(S): at 05:22

## 2017-11-25 RX ADMIN — HEPARIN SODIUM 5000 UNIT(S): 5000 INJECTION INTRAVENOUS; SUBCUTANEOUS at 05:22

## 2017-11-29 ENCOUNTER — APPOINTMENT (OUTPATIENT)
Dept: THORACIC SURGERY | Facility: CLINIC | Age: 42
End: 2017-11-29
Payer: COMMERCIAL

## 2017-11-29 VITALS
TEMPERATURE: 98.2 F | SYSTOLIC BLOOD PRESSURE: 119 MMHG | HEART RATE: 71 BPM | OXYGEN SATURATION: 93 % | DIASTOLIC BLOOD PRESSURE: 73 MMHG

## 2017-11-29 DIAGNOSIS — J93.9 PNEUMOTHORAX, UNSPECIFIED: ICD-10-CM

## 2017-11-29 PROCEDURE — 99024 POSTOP FOLLOW-UP VISIT: CPT

## 2017-12-01 PROBLEM — J93.9 PNEUMOTHORAX, UNSPECIFIED TYPE: Status: ACTIVE | Noted: 2017-12-01

## 2017-12-19 ENCOUNTER — APPOINTMENT (OUTPATIENT)
Dept: PULMONOLOGY | Facility: CLINIC | Age: 42
End: 2017-12-19

## 2018-03-23 NOTE — ED PROVIDER NOTE - NS_ ATTENDINGSCRIBEDETAILS _ED_A_ED_FT
no numbness/no tingling/no weakness [I agree with scribe documentation except where as noted below]   42 yof pmhx copd/emphysema still daily smoker, prior hx of pneumothorax which she states resolved spontaneously w supplemental 02, presents today w right sided back pain and mild sob after bending over few days ago. staets tried nsaids and muscle relaxers at home w/o relief. today went to urgent care and had xray which was concerning - sent to ED for further eval. no cp, no f/c, no cough.     ros and exam as above    pt w diminished breath sounds to right base concerning for spontaneous pneumothorax/ ruptured bleb in setting of known copd. xray and ct confirm. pt satting 100% on ra however placed on supplemental 02, HD stable and in no resp distress. do not feel that pt requires acute decompression at this time. will admit for ongoing ct surg eval. BERNABE Argueta MD

## 2018-09-11 PROBLEM — J93.9 PNEUMOTHORAX, UNSPECIFIED: Chronic | Status: ACTIVE | Noted: 2017-11-30

## 2018-09-11 PROBLEM — J43.9 EMPHYSEMA, UNSPECIFIED: Chronic | Status: ACTIVE | Noted: 2017-11-30

## 2018-09-18 ENCOUNTER — APPOINTMENT (OUTPATIENT)
Dept: PULMONOLOGY | Facility: CLINIC | Age: 43
End: 2018-09-18

## 2018-09-26 ENCOUNTER — RECORD ABSTRACTING (OUTPATIENT)
Age: 43
End: 2018-09-26

## 2018-09-27 ENCOUNTER — APPOINTMENT (OUTPATIENT)
Dept: PULMONOLOGY | Facility: CLINIC | Age: 43
End: 2018-09-27

## 2018-10-30 ENCOUNTER — EMERGENCY (EMERGENCY)
Facility: HOSPITAL | Age: 43
LOS: 1 days | Discharge: HOME | End: 2018-10-30
Attending: EMERGENCY MEDICINE
Payer: COMMERCIAL

## 2018-10-30 VITALS
TEMPERATURE: 98 F | HEART RATE: 65 BPM | DIASTOLIC BLOOD PRESSURE: 72 MMHG | OXYGEN SATURATION: 98 % | RESPIRATION RATE: 18 BRPM | SYSTOLIC BLOOD PRESSURE: 127 MMHG

## 2018-10-30 VITALS
HEART RATE: 67 BPM | RESPIRATION RATE: 18 BRPM | HEIGHT: 65 IN | DIASTOLIC BLOOD PRESSURE: 70 MMHG | TEMPERATURE: 98 F | SYSTOLIC BLOOD PRESSURE: 112 MMHG | OXYGEN SATURATION: 96 % | WEIGHT: 149.91 LBS

## 2018-10-30 LAB
ALBUMIN SERPL ELPH-MCNC: 3.9 G/DL — SIGNIFICANT CHANGE UP (ref 3.3–5)
ALP SERPL-CCNC: 60 U/L — SIGNIFICANT CHANGE UP (ref 40–120)
ALT FLD-CCNC: 25 U/L — SIGNIFICANT CHANGE UP (ref 10–45)
ANION GAP SERPL CALC-SCNC: 13 MMOL/L — SIGNIFICANT CHANGE UP (ref 5–17)
AST SERPL-CCNC: 39 U/L — SIGNIFICANT CHANGE UP (ref 10–40)
BASE EXCESS BLDV CALC-SCNC: 0.6 MMOL/L — SIGNIFICANT CHANGE UP (ref -2–2)
BASOPHILS # BLD AUTO: 0 K/UL — SIGNIFICANT CHANGE UP (ref 0–0.2)
BASOPHILS NFR BLD AUTO: 0 % — SIGNIFICANT CHANGE UP (ref 0–2)
BILIRUB SERPL-MCNC: 0.5 MG/DL — SIGNIFICANT CHANGE UP (ref 0.2–1.2)
BUN SERPL-MCNC: 18 MG/DL — SIGNIFICANT CHANGE UP (ref 7–23)
CA-I SERPL-SCNC: 1.11 MMOL/L — LOW (ref 1.12–1.3)
CALCIUM SERPL-MCNC: 9 MG/DL — SIGNIFICANT CHANGE UP (ref 8.4–10.5)
CHLORIDE BLDV-SCNC: 112 MMOL/L — HIGH (ref 96–108)
CHLORIDE SERPL-SCNC: 104 MMOL/L — SIGNIFICANT CHANGE UP (ref 96–108)
CO2 BLDV-SCNC: 26 MMOL/L — SIGNIFICANT CHANGE UP (ref 22–30)
CO2 SERPL-SCNC: 22 MMOL/L — SIGNIFICANT CHANGE UP (ref 22–31)
CREAT SERPL-MCNC: 0.62 MG/DL — SIGNIFICANT CHANGE UP (ref 0.5–1.3)
EOSINOPHIL # BLD AUTO: 0 K/UL — SIGNIFICANT CHANGE UP (ref 0–0.5)
EOSINOPHIL NFR BLD AUTO: 5 % — SIGNIFICANT CHANGE UP (ref 0–6)
GAS PNL BLDV: 134 MMOL/L — LOW (ref 136–145)
GAS PNL BLDV: SIGNIFICANT CHANGE UP
GLUCOSE BLDV-MCNC: 122 MG/DL — HIGH (ref 70–99)
GLUCOSE SERPL-MCNC: 120 MG/DL — HIGH (ref 70–99)
HCO3 BLDV-SCNC: 25 MMOL/L — SIGNIFICANT CHANGE UP (ref 21–29)
HCT VFR BLD CALC: 41 % — SIGNIFICANT CHANGE UP (ref 34.5–45)
HCT VFR BLDA CALC: 42 % — SIGNIFICANT CHANGE UP (ref 39–50)
HGB BLD CALC-MCNC: 13.7 G/DL — SIGNIFICANT CHANGE UP (ref 11.5–15.5)
HGB BLD-MCNC: 14 G/DL — SIGNIFICANT CHANGE UP (ref 11.5–15.5)
LACTATE BLDV-MCNC: 2.3 MMOL/L — HIGH (ref 0.7–2)
LYMPHOCYTES # BLD AUTO: 2.4 K/UL — SIGNIFICANT CHANGE UP (ref 1–3.3)
LYMPHOCYTES # BLD AUTO: 26 % — SIGNIFICANT CHANGE UP (ref 13–44)
MCHC RBC-ENTMCNC: 34 GM/DL — SIGNIFICANT CHANGE UP (ref 32–36)
MCHC RBC-ENTMCNC: 35.1 PG — HIGH (ref 27–34)
MCV RBC AUTO: 103 FL — HIGH (ref 80–100)
MONOCYTES # BLD AUTO: 0.5 K/UL — SIGNIFICANT CHANGE UP (ref 0–0.9)
MONOCYTES NFR BLD AUTO: 9 % — SIGNIFICANT CHANGE UP (ref 2–14)
NEUTROPHILS # BLD AUTO: 5 K/UL — SIGNIFICANT CHANGE UP (ref 1.8–7.4)
NEUTROPHILS NFR BLD AUTO: 56 % — SIGNIFICANT CHANGE UP (ref 43–77)
NEUTS BAND # BLD: 1 % — SIGNIFICANT CHANGE UP (ref 0–8)
NT-PROBNP SERPL-SCNC: 45 PG/ML — SIGNIFICANT CHANGE UP (ref 0–300)
OTHER CELLS CSF MANUAL: 17 ML/DL — LOW (ref 18–22)
PCO2 BLDV: 40 MMHG — SIGNIFICANT CHANGE UP (ref 35–50)
PH BLDV: 7.41 — SIGNIFICANT CHANGE UP (ref 7.35–7.45)
PLAT MORPH BLD: NORMAL — SIGNIFICANT CHANGE UP
PLATELET # BLD AUTO: 165 K/UL — SIGNIFICANT CHANGE UP (ref 150–400)
PO2 BLDV: 69 MMHG — HIGH (ref 25–45)
POTASSIUM BLDV-SCNC: 3.5 MMOL/L — SIGNIFICANT CHANGE UP (ref 3.5–5.3)
POTASSIUM SERPL-MCNC: 4.5 MMOL/L — SIGNIFICANT CHANGE UP (ref 3.5–5.3)
POTASSIUM SERPL-SCNC: 4.5 MMOL/L — SIGNIFICANT CHANGE UP (ref 3.5–5.3)
PROT SERPL-MCNC: 6.5 G/DL — SIGNIFICANT CHANGE UP (ref 6–8.3)
RBC # BLD: 3.98 M/UL — SIGNIFICANT CHANGE UP (ref 3.8–5.2)
RBC # FLD: 12.2 % — SIGNIFICANT CHANGE UP (ref 10.3–14.5)
RBC BLD AUTO: SIGNIFICANT CHANGE UP
SAO2 % BLDV: 93 % — HIGH (ref 67–88)
SODIUM SERPL-SCNC: 139 MMOL/L — SIGNIFICANT CHANGE UP (ref 135–145)
TROPONIN T, HIGH SENSITIVITY RESULT: <6 NG/L — SIGNIFICANT CHANGE UP (ref 0–51)
VARIANT LYMPHS # BLD: 3 % — SIGNIFICANT CHANGE UP (ref 0–6)
WBC # BLD: 7.8 K/UL — SIGNIFICANT CHANGE UP (ref 3.8–10.5)
WBC # FLD AUTO: 7.8 K/UL — SIGNIFICANT CHANGE UP (ref 3.8–10.5)

## 2018-10-30 PROCEDURE — 99284 EMERGENCY DEPT VISIT MOD MDM: CPT | Mod: 25

## 2018-10-30 PROCEDURE — 71046 X-RAY EXAM CHEST 2 VIEWS: CPT | Mod: 26

## 2018-10-30 PROCEDURE — 82330 ASSAY OF CALCIUM: CPT

## 2018-10-30 PROCEDURE — 82435 ASSAY OF BLOOD CHLORIDE: CPT

## 2018-10-30 PROCEDURE — 82947 ASSAY GLUCOSE BLOOD QUANT: CPT

## 2018-10-30 PROCEDURE — 99284 EMERGENCY DEPT VISIT MOD MDM: CPT

## 2018-10-30 PROCEDURE — 84295 ASSAY OF SERUM SODIUM: CPT

## 2018-10-30 PROCEDURE — 83880 ASSAY OF NATRIURETIC PEPTIDE: CPT

## 2018-10-30 PROCEDURE — 71046 X-RAY EXAM CHEST 2 VIEWS: CPT

## 2018-10-30 PROCEDURE — 85027 COMPLETE CBC AUTOMATED: CPT

## 2018-10-30 PROCEDURE — 82803 BLOOD GASES ANY COMBINATION: CPT

## 2018-10-30 PROCEDURE — 80053 COMPREHEN METABOLIC PANEL: CPT

## 2018-10-30 PROCEDURE — 85014 HEMATOCRIT: CPT

## 2018-10-30 PROCEDURE — 71250 CT THORAX DX C-: CPT

## 2018-10-30 PROCEDURE — 83605 ASSAY OF LACTIC ACID: CPT

## 2018-10-30 PROCEDURE — 71250 CT THORAX DX C-: CPT | Mod: 26

## 2018-10-30 PROCEDURE — 84132 ASSAY OF SERUM POTASSIUM: CPT

## 2018-10-30 PROCEDURE — 84484 ASSAY OF TROPONIN QUANT: CPT

## 2018-10-30 RX ORDER — IBUPROFEN 200 MG
600 TABLET ORAL ONCE
Qty: 0 | Refills: 0 | Status: COMPLETED | OUTPATIENT
Start: 2018-10-30 | End: 2018-10-30

## 2018-10-30 RX ADMIN — Medication 600 MILLIGRAM(S): at 13:31

## 2018-10-30 NOTE — ED ADULT NURSE NOTE - OBJECTIVE STATEMENT
Pt is an ambulatory 43 yr old female a/o X 3 c/o shortness of breathe that has been getting worse over last day or so.  Pt had spontaneous pneumothorax last year.  PERRl wnl, bruner with equal strength.  LUNGS cta.  No chest pain.  SOb at rest, c/o right upper back pain.  Abdomen NT ND with BS+4Q.  SKIN WDI.  Peripheral pulses +2bl n edema

## 2018-10-30 NOTE — ED PROVIDER NOTE - PHYSICAL EXAMINATION
Vitals: WNL  Gen: laying comfortably in NAD  Head: NCAT  ENT: sclerae white, anicterus, moist mucous membranes. No exudates.   CV: RRR. Audible S1 and S2. No murmurs, rubs, gallops, S3, nor S4, 2+ radial and DP pulses   Pulm: Clear to auscultation bilaterally. No wheezes, rales, or rhonchi  Abd: soft, normoactive BS x4, NTND, no rebound, no guarding, no rashes  Musculoskeletal:  No peripheral edema  Skin: no lesions or scars noted  Neurologic: AAOx3  : no CVA tenderness

## 2018-10-30 NOTE — ED PROVIDER NOTE - MEDICAL DECISION MAKING DETAILS
42yo F h/o COPD, bronchitis, spontaneous PTX s/p multiple chest tubes, pleurodesis, VATS p/w R stabbing back pain thoracic region and sob since yesterday. 42yo F h/o COPD, bronchitis, spontaneous PTX s/p multiple chest tubes, pleurodesis, VATS p/w R stabbing back pain thoracic region and sob since yesterday. r/o ptx.

## 2018-10-30 NOTE — ED PROVIDER NOTE - OBJECTIVE STATEMENT
42yo F h/o COPD, bronchitis, spontaneous PTX s/p multiple chest tubes, pleurodesis, VATS p/w R stabbing back pain thoracic region and sob since yesterday. Was mopping floor when it occurred. worse with exertion, inspiration, movement. Took aspirin which didn't help. Pain feels like her PTX pain although this time pain is less severe. Had CXR yesterday at urgent care for eval of this which showed "air pockets/bubbles." +slight nonproductive cough. Denies f/c, URI symptoms, n/v, cp, weak/numb/tingling. 42yo F h/o COPD, bronchitis, spontaneous PTX s/p multiple chest tubes, pleurodesis, VATS p/w R stabbing back pain thoracic region and sob since yesterday. Was mopping floor when it occurred. worse with exertion, inspiration, movement. Took aspirin which didn't help. Pain feels like her PTX pain although this time pain is less severe. Had CXR yesterday at urgent care for eval of this which showed "air pockets/bubbles." told to get f/u XR in 72h however pt with worsening pain so came in today. +slight nonproductive cough. Denies f/c, URI symptoms, n/v, cp, weak/numb/tingling. 44yo F h/o COPD, bronchitis, spontaneous PTX s/p multiple chest tubes, pleurodesis, VATS p/w R stabbing back pain thoracic region and sob since yesterday. Was mopping floor when it occurred. worse with exertion, inspiration, movement. Took aspirin which didn't help. Pain feels like her PTX pain although this time pain is less severe. Had CXR yesterday at urgent care for eval of this which showed "air pockets/bubbles." told to get f/u XR in 72h however pt with worsening pain so came in today. +slight nonproductive cough. Denies f/c, URI symptoms, n/v, cp, weak/numb/tingling.    Attendinyo female with history of pneumothorax presents with right low back pain.  Pt cleans and felt it after she did some pulling movement the other day.  No fever or chills.  went to urgent care yesterday and told she had a tiny pneumothorax.  pt was told to follow up for repeat xray in 72 hours, but she was anxious and returned today.

## 2018-10-30 NOTE — ED ADULT TRIAGE NOTE - CHIEF COMPLAINT QUOTE
short of breath X 1 day   H/O spontaneous pneumothorax  Last year Pt  feels the similar feeling  C/O Cough X few days  Denies fever chills

## 2018-10-30 NOTE — ED PROVIDER NOTE - NS ED ROS FT
Constitutional: no fevers, chills  HEENT: no visual changes, no sore throat, no rhinorrhea  CV: no cp  Resp: + sob  GI: no abd pain, n/v, diarrhea/constipation  : no dysuria, hematuria  MSK: +back pain  skin: no rashes  neuro: no HA, no confusion

## 2019-02-11 NOTE — ED PROVIDER NOTE - NS ED MD DISPO DISCHARGE CCDA
Addended by: CHUY HOLT on: 2/11/2019 10:32 AM     Modules accepted: Orders    
Patient/Caregiver provided printed discharge information.

## 2019-11-02 NOTE — ED ADULT NURSE NOTE - PRO INTERPRETER NEED 2
OFFICE VISIT      Patient: Eboni Watts   : 1930 MRN: [id number]    SUBJECTIVE:  Chief Complaint   Patient presents with   • Pre-Op Exam     DOS 2019 Location: [facility] Surgeon: Dr.Thomas Ferrer        A 89 year old female is here for a preoperative exam for a scheduled cataract surgery of the right eye at [facility] on 2019 with Dr. Mo Ferrer.    HISTORY OF PRESENT ILLNESS:    Preoperative evaluation: She is scheduled for a cataract surgery of the right eye at [facility] on 2019 with Dr. Mo Ferrer. Her EKG showed atrial fibrillation with controlled rhythm, LVH by voltage and  non-specific T wave changes. Her lab work showed normal  white blood cell count, platelet count, urinalysis, kidney and liver function. Her hemoglobin was a little low measuring 11.5; however, showed only a small change from her previous hemoglobin of 11.9. Denies any chest pain or shortness of breath. States her appetite is good. Denies any throat issues, nausea, vomiting, blood in stools, hematuria, depression, headaches, fainting spells, cold intolerance, allergies, rashes or bruising easily. Admits having some back pain.    History of hypertension: Has a history of heart failure. On medications. Her blood pressure was high. She did not take her antihypertensive medications this morning.    Additional comments:  She requests for shingles vaccine.  She has a weight gain of 1 lb.    PAST MEDICAL HISTORY:  Past Medical History:   Diagnosis Date   • Congestive cardiac failure (CMS/HCC)    • Essential (primary) hypertension        MEDICATIONS:  Current Outpatient Medications   Medication Sig   • ketorolac (ACULAR) 0.5 % ophthalmic solution    • ofloxacin (OCUFLOX) 0.3 % ophthalmic solution    • prednisoLONE acetate (PRED FORTE, OMNIPRED) 1 % ophthalmic suspension    • ammonium lactate (LAC-HYDRIN) 12 % cream    • brimonidine (ALPHAGAN) 0.2 % ophthalmic solution    •  colchicine 0.6 MG capsule    • latanoprost (XALATAN) 0.005 % ophthalmic solution    • hydrALAZINE (APRESOLINE) 25 MG tablet Take 1 tablet by mouth 2 times daily.   • furosemide (LASIX) 40 MG tablet Take 1 tablet by mouth 2 times daily.   • metoPROLOL succinate (TOPROL-XL) 25 MG 24 hr tablet Take 0.5 tablets by mouth daily.   • isosorbide dinitrate (ISORDIL) 20 MG tablet Take 1 tablet by mouth 2 times daily.   • rivaroxaban (XARELTO) 15 MG Tab Take 1 tablet by mouth daily (with dinner).   • albuterol-ipratropium 2.5 mg/0.5 mg (DUONEB) 0.5-2.5 (3) MG/3ML nebulizer solution Take 3 mLs by nebulization every 6 hours as needed for Wheezing or Shortness of Breath.     No current facility-administered medications for this visit.        ALLERGIES:  ALLERGIES:   Allergen Reactions   • Sulfa Antibiotics Other (See Comments) and HIVES     Unknown   • Amoxicillin-Pot Clavulanate Other (See Comments)     Unknown   • Lisinopril Cough       PAST SURGICAL HISTORY:  Past Surgical History:   Procedure Laterality Date   • Hysterectomy     • Thyroid cyst excision         FAMILY HISTORY:  History reviewed. No pertinent family history.    SOCIAL HISTORY:  Social History     Tobacco Use   Smoking Status Never Smoker   Smokeless Tobacco Never Used     Social History     Substance and Sexual Activity   Alcohol Use No   • Frequency: Never       Review of Systems    Constitutional: Appetite is normal. Weight gain.  HENT: No throat discomfort or sore throat.   Eyes: Vision issues.  Respiratory: No shortness of breath.  Cardiovascular: No chest pain.   Gastrointestinal: No nausea, vomiting or blood in stool.  Endocrine: No cold intolerance.  Genitourinary: No gross hematuria.  Musculoskeletal: Back pain.  Skin: No rashes.  Allergic/Immunologic: No allergies.  Neurological: No dizziness, headache or fainting spells.  Hematological: No bruising.  Psychiatric/Behavioral: No depression.    OBJECTIVE:    Vitals:    11/01/19 0807   BP: 160/87   Pulse:  56   Temp: 97.2 °F (36.2 °C)   SpO2: 100%   Weight: 66.6 kg (146 lb 12.8 oz)   Height: 5' 3\" (1.6 m)         Physical Exam    Constitutional: Well nourished and well-developed female, appearing in stated age. In no acute distress.   HEENT: Atraumatic and normocephalic. The conjunctiva exhibited no abnormalities. The sclera was normal. Throat is clear    Neck: Supple neck. No adenopathy.  Pulmonary: Breath sounds clear to auscultation bilaterally. No rales, crackles, rhonchi or wheezing.  Cardiovascular: Normal rate, regular rhythm, normal S1, normal S2. No murmur appreciated. 1+ edema in the lower extremities.   Abdomen: Soft, benign, and nontender. No organomegaly appreciated.   Psychiatric: Oriented to time, place, and person. Normal mood and affect.   Skin: Skin moisture and turgor normal.    DIAGNOSTIC STUDIES:  LAB RESULTS:    Lab Services on 10/31/2019   Component Date Value Ref Range Status   • COLOR 10/31/2019 YELLOW  YELLOW Final   • APPEARANCE 10/31/2019 CLEAR   Final   • GLUCOSE(URINE) 10/31/2019 NEGATIVE  NEGATIVE mg/dL Final   • BILIRUBIN 10/31/2019 NEGATIVE  NEGATIVE Final   • KETONES 10/31/2019 NEGATIVE  NEGATIVE mg/dL Final   • SPECIFIC GRAVITY 10/31/2019 1.011  1.005 - 1.030 Final   • BLOOD 10/31/2019 NEGATIVE  NEGATIVE Final   • pH 10/31/2019 6.0  5.0 - 7.0 Units Final   • PROTEIN(URINE) 10/31/2019 30* NEGATIVE mg/dL Final   • UROBILINOGEN 10/31/2019 0.2  0.0 - 1.0 mg/dL Final   • NITRITE 10/31/2019 NEGATIVE  NEGATIVE Final   • LEUKOCYTE ESTERASE 10/31/2019 NEGATIVE  NEGATIVE Final   • SPECIMEN TYPE 10/31/2019 URINE, CLEAN CATCH/MIDSTREAM   Final   • Squamous EPI'S 10/31/2019 1 to 5  0 - 5 /hpf Final   • RBC 10/31/2019 NONE SEEN  0 - 2 /hpf Final   • WBC 10/31/2019 1 to 5  0 - 5 /hpf Final   • BACTERIA 10/31/2019 NONE SEEN  NONE SEEN /hpf Final   • Hyaline Casts 10/31/2019 NONE SEEN  0 - 5 /lpf Final   • MUCOUS 10/31/2019 PRESENT   Final   • Fasting Status 10/31/2019 UNKNOWN  hrs Final   • Sodium  10/31/2019 141  135 - 145 mmol/L Final   • Potassium 10/31/2019 5.0  3.4 - 5.1 mmol/L Final   • Chloride 10/31/2019 106  98 - 107 mmol/L Final   • Carbon Dioxide 10/31/2019 29  21 - 32 mmol/L Final   • Anion Gap 10/31/2019 11  10 - 20 mmol/L Final   • Glucose 10/31/2019 87  65 - 99 mg/dL Final   • BUN 10/31/2019 24* 6 - 20 mg/dL Final   • Creatinine 10/31/2019 0.93  0.51 - 0.95 mg/dL Final   • GFR Estimate,  10/31/2019 63   Final   • GFR Estimate, Non  10/31/2019 54   Final   • BUN/Creatinine Ratio 10/31/2019 26* 7 - 25 Final   • CALCIUM 10/31/2019 8.9  8.4 - 10.2 mg/dL Final   • TOTAL BILIRUBIN 10/31/2019 0.4  0.2 - 1.0 mg/dL Final   • AST/SGOT 10/31/2019 20  <38 Units/L Final   • ALT/SGPT 10/31/2019 19  <64 Units/L Final   • ALK PHOSPHATASE 10/31/2019 90  45 - 117 Units/L Final   • TOTAL PROTEIN 10/31/2019 6.8  6.4 - 8.2 g/dL Final   • Albumin 10/31/2019 3.5* 3.6 - 5.1 g/dL Final   • GLOBULIN 10/31/2019 3.3  2.0 - 4.0 g/dL Final   • A/G Ratio, Serum 10/31/2019 1.1  1.0 - 2.4 Final   • WBC 10/31/2019 4.5  4.2 - 11.0 K/mcL Final   • RBC 10/31/2019 4.09  4.00 - 5.20 mil/mcL Final   • HGB 10/31/2019 11.5* 12.0 - 15.5 g/dL Final   • HCT 10/31/2019 36.3  36.0 - 46.5 % Final   • MCV 10/31/2019 88.8  78.0 - 100.0 fl Final   • MCH 10/31/2019 28.1  26.0 - 34.0 pg Final   • MCHC 10/31/2019 31.7* 32.0 - 36.5 g/dL Final   • RDW-CV 10/31/2019 16.1* 11.0 - 15.0 % Final   • PLT 10/31/2019 170  140 - 450 K/mcL Final   • NRBC 10/31/2019 0  0 /100 WBC Final   • DIFF TYPE 10/31/2019 AUTOMATED DIFFERENTIAL   Final   • Neutrophil 10/31/2019 67  % Final   • LYMPH 10/31/2019 22  % Final   • MONO 10/31/2019 10  % Final   • EOSIN 10/31/2019 1  % Final   • BASO 10/31/2019 0  % Final   • Percent Immature Granuloctyes 10/31/2019 0  % Final   • Absolute Neutrophil 10/31/2019 3.0  1.8 - 7.7 K/mcL Final   • Absolute Lymph 10/31/2019 1.0  1.0 - 4.0 K/mcL Final   • Absolute Mono 10/31/2019 0.5  0.3 - 0.9 K/mcL Final    • Absolute Eos 10/31/2019 0.1  0.1 - 0.5 K/mcL Final   • Absolute Baso 10/31/2019 0.0  0.0 - 0.3 K/mcL Final   • Absolute Immature Granulocytes 10/31/2019 0.0  0 - 0.2 K/mcl Final         ASSESSMENT AND PLAN:  This is a 89 year old female who presents with :    1. Pre-operative cardiovascular examination    2. Atrial fibrillation, unspecified type (CMS/HCC)    3. Chronic systolic congestive heart failure (CMS/HCC)    4. Essential hypertension    5. Cataract of right eye, unspecified cataract type          No orders of the defined types were placed in this encounter.      Plan:    Pre-operative cardiovascular examination/cataract of right eye, unspecified cataract type:  Reviewed and discussed previous EKG and labs.  She is clinically stable.  She is mildly anemic; however, that is not going to prevent her from undergoing surgery.  She is cleared for her procedure.  She is at a low risk for surgery.    Atrial fibrillation, unspecified type (CMS/HCC)/chronic systolic congestive heart failure (CMS/HCC):  She is compensated with heart failure.    Essential hypertension:  Instructed her that she needs to take her antihypertensive medications before she goes for her procedure or else, they might cancel her procedure.    Deferred shingles vaccine due to unavailability.  Follow up as scheduled.    Refer to orders.  Medical compliance with plan discussed and risks of non-compliance reviewed.  Patient education completed on disease process, etiology & prognosis.  Proper usage and side effects of medications reviewed & discussed.  Patient understands and agrees with the plan.  Return to clinic as clinically indicated as discussed with patient who verbalized understanding of the plan and is in agreement with the plan.    Entered by Dr. Xavier Richardson acting as scribe for Dr. Miquel Jiménez MD   English

## 2020-05-10 ENCOUNTER — EMERGENCY (EMERGENCY)
Facility: HOSPITAL | Age: 45
LOS: 1 days | Discharge: HOME | End: 2020-05-10
Attending: EMERGENCY MEDICINE
Payer: COMMERCIAL

## 2020-05-10 VITALS
DIASTOLIC BLOOD PRESSURE: 80 MMHG | HEIGHT: 65 IN | RESPIRATION RATE: 18 BRPM | OXYGEN SATURATION: 97 % | HEART RATE: 71 BPM | WEIGHT: 169.98 LBS | TEMPERATURE: 98 F | SYSTOLIC BLOOD PRESSURE: 137 MMHG

## 2020-05-10 VITALS
OXYGEN SATURATION: 100 % | SYSTOLIC BLOOD PRESSURE: 147 MMHG | TEMPERATURE: 98 F | RESPIRATION RATE: 18 BRPM | DIASTOLIC BLOOD PRESSURE: 73 MMHG | HEART RATE: 78 BPM

## 2020-05-10 PROCEDURE — 90471 IMMUNIZATION ADMIN: CPT

## 2020-05-10 PROCEDURE — 99283 EMERGENCY DEPT VISIT LOW MDM: CPT | Mod: 25

## 2020-05-10 PROCEDURE — 12011 RPR F/E/E/N/L/M 2.5 CM/<: CPT

## 2020-05-10 PROCEDURE — 90715 TDAP VACCINE 7 YRS/> IM: CPT

## 2020-05-10 RX ORDER — TETANUS TOXOID, REDUCED DIPHTHERIA TOXOID AND ACELLULAR PERTUSSIS VACCINE, ADSORBED 5; 2.5; 8; 8; 2.5 [IU]/.5ML; [IU]/.5ML; UG/.5ML; UG/.5ML; UG/.5ML
0.5 SUSPENSION INTRAMUSCULAR ONCE
Refills: 0 | Status: COMPLETED | OUTPATIENT
Start: 2020-05-10 | End: 2020-05-10

## 2020-05-10 RX ORDER — IBUPROFEN 200 MG
400 TABLET ORAL ONCE
Refills: 0 | Status: COMPLETED | OUTPATIENT
Start: 2020-05-10 | End: 2020-05-10

## 2020-05-10 RX ORDER — ACETAMINOPHEN 500 MG
975 TABLET ORAL ONCE
Refills: 0 | Status: COMPLETED | OUTPATIENT
Start: 2020-05-10 | End: 2020-05-10

## 2020-05-10 RX ADMIN — Medication 400 MILLIGRAM(S): at 23:11

## 2020-05-10 RX ADMIN — TETANUS TOXOID, REDUCED DIPHTHERIA TOXOID AND ACELLULAR PERTUSSIS VACCINE, ADSORBED 0.5 MILLILITER(S): 5; 2.5; 8; 8; 2.5 SUSPENSION INTRAMUSCULAR at 23:12

## 2020-05-10 RX ADMIN — Medication 975 MILLIGRAM(S): at 23:11

## 2020-05-10 NOTE — ED ADULT NURSE NOTE - NSIMPLEMENTINTERV_GEN_ALL_ED
Implemented All Fall Risk Interventions:  [place] to call system. Call bell, personal items and telephone within reach. Instruct patient to call for assistance. Room bathroom lighting operational. Non-slip footwear when patient is off stretcher. Physically safe environment: no spills, clutter or unnecessary equipment. Stretcher in lowest position, wheels locked, appropriate side rails in place. Provide visual cue, wrist band, yellow gown, etc. Monitor gait and stability. Monitor for mental status changes and reorient to person, place, and time. Review medications for side effects contributing to fall risk. Reinforce activity limits and safety measures with patient and family.

## 2020-05-10 NOTE — ED ADULT NURSE NOTE - OBJECTIVE STATEMENT
the patient is a 44y female from triage, complaining of left sided facial swelling, bruising following a fall around 0700, patient reports coming to ED because swelling was increasing. a/o x3, neuro intact, denies LOC, VSS, PMH emphysema w/lung sx, denies chest pain, shortness of breath, dizziness, CTAB, call bell in reach, comfort and safety provided.

## 2020-05-10 NOTE — ED PROVIDER NOTE - ATTENDING CONTRIBUTION TO CARE
Pt with trip and fall, hit head forehead 12hrs ago, no headache, no vomiting, no syncope.  Neosporin to forehead at home.  Exam: Nexus criteria met and negative.  No cervical, thoracic, or lumbar spine tenderness.  No motor weakness of hand or arm, no sensory deficit. contusion L forehead with tracking down to supraorbital.  PERRL.

## 2020-05-10 NOTE — ED PROVIDER NOTE - PHYSICAL EXAMINATION
Physical Exam:  Gen: NAD, AOx3, non-toxic appearing, able to ambulate without assistance  Head: 2 cm diagonal laceration to L superior forehead close to midline and hairline   HEENT: supraorbital swelling and TTP of L eye, EOMI, PEERLA, normal conjunctiva, tongue midline, oral mucosa moist  Lung: CTAB, no respiratory distress, no wheezes/rhonchi/rales B/L, speaking in full sentences  CV: RRR, no murmurs, rubs or gallops, distal pulses 2+ b/l  Abd: soft, NT, ND, no guarding, no rigidity, no rebound tenderness, no CVA tenderness   MSK: no visible deformities, ROM normal in UE/LE, no back TTP  Neuro: CN II-XII intact, 5/5 strength b/l, no focal sensory or motor deficits  Skin: Warm, well perfused, no rash, no leg swelling  Psych: normal affect, calm

## 2020-05-10 NOTE — ED PROVIDER NOTE - CARE PLAN
Principal Discharge DX:	Closed head injury  Secondary Diagnosis:	Forehead contusion, initial encounter Principal Discharge DX:	Closed head injury  Secondary Diagnosis:	Forehead laceration, initial encounter

## 2020-05-10 NOTE — ED PROVIDER NOTE - OBJECTIVE STATEMENT
45yo female emphysema p/w L forehead pain and swelling s/p trip and fall this AM. Was walking and tripped on side walk and fell onto face. No LOC, no AC use. Came in because swelling around L eye was worsening. Denies visual changes, blurry vision, inability to move eye, new numbness/weakness of limbs.

## 2020-05-10 NOTE — ED PROVIDER NOTE - PATIENT PORTAL LINK FT
You can access the FollowMyHealth Patient Portal offered by [facility] by registering at the following website: [email or web address]. By joining [facility]’s FollowMyHealth portal, you will also be able to view your health information using other applications (apps) compatible with our system.

## 2020-05-10 NOTE — ED PROVIDER NOTE - NSFOLLOWUPINSTRUCTIONS_ED_ALL_ED_FT
- Continue all regular medications  - For pain, take tylenol or ibuprofen as directed on the packaging  - Follow up with your primary doctor within 5-7 days for suture removal  - Refer to attached wound care handout  - You were given copies of labs and/or imaging results if applicable, please take them to your follow up appointments  - Return to the ER for any worsening symptoms or concerns

## 2020-05-10 NOTE — ED PROVIDER NOTE - CLINICAL SUMMARY MEDICAL DECISION MAKING FREE TEXT BOX
Dr. Quick Note: closed head injury with contusion 12hrs post injury, no risk factors, no indication for emergency head imaging.

## 2020-05-10 NOTE — ED ADULT NURSE NOTE - CHIEF COMPLAINT QUOTE
left sided facial swelling s/p mechanical trip and fall this morning.  Denies fevers, cough, SOB, no known COVID contacts

## 2020-05-10 NOTE — ED ADULT TRIAGE NOTE - CHIEF COMPLAINT QUOTE
left eye swelling s/p mechanical trip and fall this morning. No visual changes. Denies fevers, cough, SOB, no known COVID contacts left sided facial swelling s/p mechanical trip and fall this morning.  Denies fevers, cough, SOB, no known COVID contacts left sided facial swelling s/p mechanical trip and fall this morning. Denies LOC or a/c use. Denies fevers, cough, SOB, no known COVID contacts

## 2020-05-10 NOTE — ED PROVIDER NOTE - PROGRESS NOTE DETAILS
Darrell PGY1: Patient reevaluated and feeling better. VSS. Reviewed and discussed results with patient. Discussed importance of follow up and return precautions. Patient agrees with plan.

## 2021-03-19 NOTE — DISCHARGE NOTE ADULT - CARE PLAN
Yes Principal Discharge DX:	Bleb, lung  Goal:	full recovery  Instructions for follow-up, activity and diet:	Follow up with  Dr Brantley within two weeks of discharge call for appointment [phone]  take all medications as prescribed  Follow up with your PCP call for appointment  Call our office [phone] for fever chills or any concerns   supplemental  oxygen as needed   change dressing on surgical site  when moist as needed - dry clean gauze  no driving while on pain medications  Secondary Diagnosis:	Pneumothorax, unspecified type  Goal:	recovery  Instructions for follow-up, activity and diet:	Follow up with  Dr Brantley within two weeks of discharge call for appointment [phone]  take all medications as prescribed  Follow up with your PCP call for appointment  Call our office [phone] for fever chills or any concerns   supplemental  oxygen as needed   change dressing on surgical site  when moist as needed - dry clean gauze  no driving while on pain medications

## 2021-06-16 ENCOUNTER — APPOINTMENT (OUTPATIENT)
Dept: ORTHOPEDIC SURGERY | Facility: CLINIC | Age: 46
End: 2021-06-16
Payer: COMMERCIAL

## 2021-06-16 VITALS
SYSTOLIC BLOOD PRESSURE: 116 MMHG | HEIGHT: 64 IN | HEART RATE: 59 BPM | BODY MASS INDEX: 30.56 KG/M2 | DIASTOLIC BLOOD PRESSURE: 78 MMHG | TEMPERATURE: 97.2 F | WEIGHT: 179 LBS

## 2021-06-16 DIAGNOSIS — Z87.09 PERSONAL HISTORY OF OTHER DISEASES OF THE RESPIRATORY SYSTEM: ICD-10-CM

## 2021-06-16 DIAGNOSIS — G89.29 DORSALGIA, UNSPECIFIED: ICD-10-CM

## 2021-06-16 DIAGNOSIS — M54.9 DORSALGIA, UNSPECIFIED: ICD-10-CM

## 2021-06-16 DIAGNOSIS — M51.37 OTHER INTERVERTEBRAL DISC DEGENERATION, LUMBOSACRAL REGION: ICD-10-CM

## 2021-06-16 PROCEDURE — 72110 X-RAY EXAM L-2 SPINE 4/>VWS: CPT

## 2021-06-16 PROCEDURE — 99203 OFFICE O/P NEW LOW 30 MIN: CPT

## 2021-06-16 PROCEDURE — 72170 X-RAY EXAM OF PELVIS: CPT | Mod: 59

## 2021-06-16 RX ORDER — TIZANIDINE 2 MG/1
2 TABLET ORAL EVERY 8 HOURS
Qty: 30 | Refills: 2 | Status: ACTIVE | COMMUNITY
Start: 2021-06-16 | End: 1900-01-01

## 2021-06-16 RX ORDER — DICLOFENAC SODIUM 50 MG/1
50 TABLET, DELAYED RELEASE ORAL
Qty: 30 | Refills: 0 | Status: ACTIVE | COMMUNITY
Start: 2021-06-16 | End: 1900-01-01

## 2021-06-17 NOTE — PHYSICAL EXAM
[Normal] : Gait: normal [SLR] : negative straight leg raise [LE] : Sensory: Intact in bilateral lower extremities [1+] : left ankle jerk 1+ [Plantar Reflex Right Only] : absent on the right [Plantar Reflex Left Only] : absent on the left [DTR Reflexes Clonus Of Right Ankle (___ Beats)] : absent on the right [DTR Reflexes Clonus Of Left Ankle (___ Beats)] : absent on the left [DP] : dorsalis pedis 2+ and symmetric bilaterally [de-identified] : The pt is awake, alert and oriented to self, place and time, is comfortable and in no acute distress. Inspection of neck, back and lower extremities bilaterally reveals no rashes or ecchymotic lesions.  There is no obvious abnormal spinal curvature in the sagittal and coronal planes. There is no tenderness over the cervical, thoracic or lumbar spine, or the paraspinal or upper and lower extremities musculature. There is no sacroiliac tenderness. No greater trochanteric tenderness bilaterally. No atrophy or abnormal movements noted in the upper or lower extremities. There is no swelling noted in the upper or lower extremities bilaterally. No cervical lymphadenopathy noted anteriorly. No joint laxity noted in the upper and lower extremity joints bilaterally.\par Hip range of motion is degrees internal rotation 30° external rotation without pain. Full range of motion of the shoulders bilaterally with no significant pain\par There is no groin pain with hip internal rotation and a negative SRINIVASA test bilaterally.  [de-identified] : flex to mid tibia, ext 20 degrees with back pressure [de-identified] : 4 views lumbar spine obtained today demonstrate no significant scoliosis.  Normal lumbar lordosis.  Degeneration seen primarily at the L3-4 level with loss of disc height endplate sclerosis and vacuum disc phenomenon.  Small anterior osteophyte noted at the superior endplate of L4.  No dynamic instability between flexion-extension.  No acute fractures.\par \par AP pelvis demonstrates normal appearance of the hips bilaterally.  No acute fractures.  No significant degeneration.

## 2021-06-17 NOTE — HISTORY OF PRESENT ILLNESS
[Worsening] : worsening [___ mths] : [unfilled] month(s) ago [8] : an average pain level of 8/10 [Constant] : ~He/She~ states the symptoms seem to be constant [Bending] : worsened by bending [Lifting] : worsened by lifting [Prolonged Sitting] : worsened by prolonged sitting [Prolonged Standing] : worsened by prolonged standing [Walking] : worsened by walking [Rest] : relieved by rest [de-identified] : Patient is here today for low back pain with radiating pain to the right leg.  No numbness or tingling and no known injury. Not medically treated for this issue. Has had chronic back pain for about 10 years. 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

## 2021-06-17 NOTE — CONSULT LETTER
[Dear  ___] : Dear  [unfilled], [Courtesy Letter:] : I had the pleasure of seeing your patient, [unfilled], in my office today. [FreeTextEntry2] : Christiano Malcolm [FreeTextEntry1] : Thank you for this referral. I have enclosed my note for your review. Please feel free to contact my office if you have additional questions regarding this patient.\par \par Regards,\par Lex Banerjee MD, FACS, FAAOS\par \par  of Orthopaedic Surgery\par [employer] [employer]\par Spinal Reconstruction Surgery\par Minimally Invasive Spinal Surgery\par [facility]

## 2021-06-17 NOTE — DISCUSSION/SUMMARY
[Medication Risks Reviewed] : Medication risks reviewed [de-identified] : The patient has a longstanding history of low back pain.  Radiographs today demonstrate severe disc degeneration at L3-4 with vacuum disc phenomenon loss of disc height and anterior osteophytes.  Recommended a conservative treatment plan for her and prescribed her diclofenac and tizanidine.  Physical therapy was also prescribed for her today.  I will see her back in 4 to 6 weeks on as-needed basis.  If her symptoms persist or worsen MRI lumbar spine may be considered followed by injection in the lumbar spine as appropriate.  Over the long-term she understands that given the discogenic pain pattern and severe disc degeneration L3-4 selective fusion at the L3-4 level may be an option for her.\par \par The patient was educated regarding their condition, treatment options as well as prescribed course of treatment. \par Risks and benefits as well as alternatives to the proposed treatment were also provided to the patient \par They were given the opportunity to have all their questions answered to their satisfaction.\par \par Vital signs were reviewed with the patient and the patient was instructed to followup with their primary care provider for further management.\par \par Healthy lifestyle recommendations were also made including a tobacco free lifestyle, proper diet, and weight control.

## 2021-07-11 ENCOUNTER — EMERGENCY (EMERGENCY)
Facility: HOSPITAL | Age: 46
LOS: 1 days | Discharge: HOME | End: 2021-07-11
Attending: STUDENT IN AN ORGANIZED HEALTH CARE EDUCATION/TRAINING PROGRAM
Payer: COMMERCIAL

## 2021-07-11 VITALS
HEART RATE: 61 BPM | SYSTOLIC BLOOD PRESSURE: 100 MMHG | TEMPERATURE: 97 F | DIASTOLIC BLOOD PRESSURE: 77 MMHG | OXYGEN SATURATION: 96 % | RESPIRATION RATE: 18 BRPM

## 2021-07-11 VITALS
HEART RATE: 67 BPM | WEIGHT: 179.9 LBS | TEMPERATURE: 98 F | RESPIRATION RATE: 20 BRPM | SYSTOLIC BLOOD PRESSURE: 134 MMHG | HEIGHT: 65 IN | DIASTOLIC BLOOD PRESSURE: 80 MMHG | OXYGEN SATURATION: 97 %

## 2021-07-11 LAB
ALBUMIN SERPL ELPH-MCNC: 4.3 G/DL — SIGNIFICANT CHANGE UP (ref 3.3–5)
ALP SERPL-CCNC: 84 U/L — SIGNIFICANT CHANGE UP (ref 40–120)
ALT FLD-CCNC: 26 U/L — SIGNIFICANT CHANGE UP (ref 10–45)
ANION GAP SERPL CALC-SCNC: 13 MMOL/L — SIGNIFICANT CHANGE UP (ref 5–17)
APPEARANCE UR: CLEAR — SIGNIFICANT CHANGE UP
AST SERPL-CCNC: 24 U/L — SIGNIFICANT CHANGE UP (ref 10–40)
BACTERIA # UR AUTO: ABNORMAL
BASOPHILS # BLD AUTO: 0 K/UL — SIGNIFICANT CHANGE UP (ref 0–0.2)
BASOPHILS NFR BLD AUTO: 0 % — SIGNIFICANT CHANGE UP (ref 0–2)
BILIRUB SERPL-MCNC: 0.3 MG/DL — SIGNIFICANT CHANGE UP (ref 0.2–1.2)
BILIRUB UR-MCNC: NEGATIVE — SIGNIFICANT CHANGE UP
BUN SERPL-MCNC: 15 MG/DL — SIGNIFICANT CHANGE UP (ref 7–23)
CALCIUM SERPL-MCNC: 9.1 MG/DL — SIGNIFICANT CHANGE UP (ref 8.4–10.5)
CHLORIDE SERPL-SCNC: 103 MMOL/L — SIGNIFICANT CHANGE UP (ref 96–108)
CO2 SERPL-SCNC: 21 MMOL/L — LOW (ref 22–31)
COLOR SPEC: SIGNIFICANT CHANGE UP
CREAT SERPL-MCNC: 0.67 MG/DL — SIGNIFICANT CHANGE UP (ref 0.5–1.3)
DIFF PNL FLD: NEGATIVE — SIGNIFICANT CHANGE UP
EOSINOPHIL # BLD AUTO: 0.13 K/UL — SIGNIFICANT CHANGE UP (ref 0–0.5)
EOSINOPHIL NFR BLD AUTO: 2.6 % — SIGNIFICANT CHANGE UP (ref 0–6)
EPI CELLS # UR: 5 /HPF — SIGNIFICANT CHANGE UP
GLUCOSE SERPL-MCNC: 105 MG/DL — HIGH (ref 70–99)
GLUCOSE UR QL: NEGATIVE — SIGNIFICANT CHANGE UP
HCT VFR BLD CALC: 39.4 % — SIGNIFICANT CHANGE UP (ref 34.5–45)
HGB BLD-MCNC: 13.5 G/DL — SIGNIFICANT CHANGE UP (ref 11.5–15.5)
KETONES UR-MCNC: NEGATIVE — SIGNIFICANT CHANGE UP
LEUKOCYTE ESTERASE UR-ACNC: NEGATIVE — SIGNIFICANT CHANGE UP
LYMPHOCYTES # BLD AUTO: 2.53 K/UL — SIGNIFICANT CHANGE UP (ref 1–3.3)
LYMPHOCYTES # BLD AUTO: 50.4 % — HIGH (ref 13–44)
MCHC RBC-ENTMCNC: 33.1 PG — SIGNIFICANT CHANGE UP (ref 27–34)
MCHC RBC-ENTMCNC: 34.3 GM/DL — SIGNIFICANT CHANGE UP (ref 32–36)
MCV RBC AUTO: 96.6 FL — SIGNIFICANT CHANGE UP (ref 80–100)
MONOCYTES # BLD AUTO: 0.22 K/UL — SIGNIFICANT CHANGE UP (ref 0–0.9)
MONOCYTES NFR BLD AUTO: 4.3 % — SIGNIFICANT CHANGE UP (ref 2–14)
NEUTROPHILS # BLD AUTO: 1.92 K/UL — SIGNIFICANT CHANGE UP (ref 1.8–7.4)
NEUTROPHILS NFR BLD AUTO: 38.3 % — LOW (ref 43–77)
NITRITE UR-MCNC: NEGATIVE — SIGNIFICANT CHANGE UP
PH UR: 6 — SIGNIFICANT CHANGE UP (ref 5–8)
PLATELET # BLD AUTO: 192 K/UL — SIGNIFICANT CHANGE UP (ref 150–400)
POTASSIUM SERPL-MCNC: 4.1 MMOL/L — SIGNIFICANT CHANGE UP (ref 3.5–5.3)
POTASSIUM SERPL-SCNC: 4.1 MMOL/L — SIGNIFICANT CHANGE UP (ref 3.5–5.3)
PROT SERPL-MCNC: 6.9 G/DL — SIGNIFICANT CHANGE UP (ref 6–8.3)
PROT UR-MCNC: NEGATIVE — SIGNIFICANT CHANGE UP
RBC # BLD: 4.08 M/UL — SIGNIFICANT CHANGE UP (ref 3.8–5.2)
RBC # FLD: 11.7 % — SIGNIFICANT CHANGE UP (ref 10.3–14.5)
RBC CASTS # UR COMP ASSIST: 0 /HPF — SIGNIFICANT CHANGE UP (ref 0–4)
SODIUM SERPL-SCNC: 137 MMOL/L — SIGNIFICANT CHANGE UP (ref 135–145)
SP GR SPEC: 1.01 — SIGNIFICANT CHANGE UP (ref 1.01–1.02)
UROBILINOGEN FLD QL: NEGATIVE — SIGNIFICANT CHANGE UP
WBC # BLD: 5.01 K/UL — SIGNIFICANT CHANGE UP (ref 3.8–10.5)
WBC # FLD AUTO: 5.01 K/UL — SIGNIFICANT CHANGE UP (ref 3.8–10.5)
WBC UR QL: 4 /HPF — SIGNIFICANT CHANGE UP (ref 0–5)

## 2021-07-11 PROCEDURE — 84100 ASSAY OF PHOSPHORUS: CPT

## 2021-07-11 PROCEDURE — 99284 EMERGENCY DEPT VISIT MOD MDM: CPT

## 2021-07-11 PROCEDURE — 85025 COMPLETE CBC W/AUTO DIFF WBC: CPT

## 2021-07-11 PROCEDURE — 99284 EMERGENCY DEPT VISIT MOD MDM: CPT | Mod: 25

## 2021-07-11 PROCEDURE — 93005 ELECTROCARDIOGRAM TRACING: CPT

## 2021-07-11 PROCEDURE — 83735 ASSAY OF MAGNESIUM: CPT

## 2021-07-11 PROCEDURE — 81001 URINALYSIS AUTO W/SCOPE: CPT

## 2021-07-11 PROCEDURE — 80053 COMPREHEN METABOLIC PANEL: CPT

## 2021-07-11 PROCEDURE — 93010 ELECTROCARDIOGRAM REPORT: CPT

## 2021-07-11 NOTE — ED PROVIDER NOTE - NS ED ROS FT
Constitutional: no fevers no chills.   CV: no chest pain, no palpitations   Respiratory: no shortness of breath, no cough   GI: no abdominal pain, no nausea no vomiting   Neuro: no headache, no weakness, no numbness  MSK: foot swelling, no hand swelling no facial swelling, no foot pain, no bruising

## 2021-07-11 NOTE — ED PROVIDER NOTE - OBJECTIVE STATEMENT
45 F w/ hx of a R pneumothorax, here w/ bilateral foot swleling for 2 days. she states she has no fevers, no nausea no vomiting. no cp, no sob. pt states that she has no trauma to the feet. she reports that she has no hx of bruising. No abdominal pain. pt states that over the past year she has gained 50 lbs. She has increased thirst and increased UOP. Pt states she was in [place] and drove back today. Her nephew works in the ER as a nurse manager.

## 2021-07-11 NOTE — ED ADULT NURSE NOTE - CAS EDN DISCHARGE ASSESSMENT
Sw met with patient to discuss discharge planning;  Pt denies SI/HI; pt agreeable to discharge today with partial start Tuesday 2/26 at 8am;  Pt states his vehicle is in the parking lot and can drive himself home;   No additional questions or concerns for SW at this time     TRISTAN advised RN staff that patient will discharge this afternoon - Rn staff will advise patient when AVS is complete for unit walk-off Alert and oriented to person, place and time

## 2021-07-11 NOTE — ED PROVIDER NOTE - NSFOLLOWUPINSTRUCTIONS_ED_ALL_ED_FT
Peripheral Edema       Peripheral edema is swelling that is caused by a buildup of fluid. Peripheral edema most often affects the lower legs, ankles, and feet. It can also develop in the arms, hands, and face. The area of the body that has peripheral edema will look swollen. It may also feel heavy or warm. Your clothes may start to feel tight. Pressing on the area may make a temporary dent in your skin. You may not be able to move your swollen arm or leg as much as usual.    There are many causes of peripheral edema. It can happen because of a complication of other conditions such as congestive heart failure, kidney disease, or a problem with your blood circulation. It also can be a side effect of certain medicines or because of an infection. It often happens to women during pregnancy. Sometimes, the cause is not known.      Follow these instructions at home:      Managing pain, stiffness, and swelling      •Raise (elevate) your legs while you are sitting or lying down.      •Move around often to prevent stiffness and to lessen swelling.      • Do not sit or stand for long periods of time.      •Wear support stockings as told by your health care provider.      Medicines     •Take over-the-counter and prescription medicines only as told by your health care provider.      •Your health care provider may prescribe medicine to help your body get rid of excess water (diuretic).      General instructions     •Pay attention to any changes in your symptoms.      •Follow instructions from your health care provider about limiting salt (sodium) in your diet. Sometimes, eating less salt may reduce swelling.      •Moisturize skin daily to help prevent skin from cracking and draining.      •Keep all follow-up visits as told by your health care provider. This is important.        Contact a health care provider if you have:    •A fever.      •Edema that starts suddenly or is getting worse, especially if you are pregnant or have a medical condition.      •Swelling in only one leg.      •Increased swelling, redness, or pain in one or both of your legs.      •Drainage or sores at the area where you have edema.        Get help right away if you:    •Develop shortness of breath, especially when you are lying down.      •Have pain in your chest or abdomen.      •Feel weak.      •Feel faint.        Summary    •Peripheral edema is swelling that is caused by a buildup of fluid. Peripheral edema most often affects the lower legs, ankles, and feet.      •Move around often to prevent stiffness and to lessen swelling. Do not sit or stand for long periods of time.      •Pay attention to any changes in your symptoms.      •Contact a health care provider if you have edema that starts suddenly or is getting worse, especially if you are pregnant or have a medical condition.      •Get help right away if you develop shortness of breath, especially when lying down.      This information is not intended to replace advice given to you by your health care provider. Make sure you discuss any questions you have with your health care provider.

## 2021-07-11 NOTE — ED PROVIDER NOTE - CLINICAL SUMMARY MEDICAL DECISION MAKING FREE TEXT BOX
45 F w/ hx of prior pneumothorax here w/ bilateral foot swelling- atraumatic.  pt w/ no fevers, no chills. has bilateral pedal edema. pt w/ no cp, no sob intermitent quit smoking 3 years ago, pt is nontoxic appearingis aaox3, ambulatory, to have labs ekg and reassessment 45 F w/ hx of prior pneumothorax here w/ bilateral foot swelling- atraumatic.  pt w/ no fevers, no chills. has bilateral pedal edema. pt w/ no cp, no sob intermitent quit smoking 3 years ago, pt is nontoxic appearingis aaox3, ambulatory, to have labs ekg and reassessment foot swelling likely related to chronic venous stasis, dependent edema. to have labs to assess kidney function. dispo pending results no features to suggest septic joint nor fracture no bruises no ecchymosis

## 2021-07-11 NOTE — ED PROVIDER NOTE - PHYSICAL EXAMINATION
I have reviewed the triage vital signs. Const: Well-nourished, Well-developed, Eyes: no conjunctival injection and no scleral icterus ENMT: Moist mucus membranes, CVS: +S1/S2, radial/DP pulse 2+ bilaterally RESP: Unlabored respiratory effort, Clear to auscultation bilaterally GI: Nontender/Nondistended soft abdomen, no CVA tenderness MSK: Extremities w/o deformity or ttp, bilateral edema in the feet to the ankles no overlying skin changes Psych: Awake, Alert, & Orientedx3;  Appropriate mood and affect, cooperative

## 2021-07-11 NOTE — ED ADULT NURSE NOTE - OBJECTIVE STATEMENT
45yr old female w/ pmhx of pneumothorax, COPD, and mild emphysema presents to ED c/o B/L foot edema. Pt states she has been experiencing B/L pitting edema of her Lower extremities since friday. Pt denies any trauma to affected area, SOB, CP, NVD, fevers or chills. Pt states she is able to ambulate without pain or difficulty, and she has no cardiac Hx. Pt placed on CM, assessed by MD, Bed lowered and locked, call bell within reach. will reassess

## 2021-07-11 NOTE — ED PROVIDER NOTE - PATIENT PORTAL LINK FT
You can access the FollowMyHealth Patient Portal offered by [facility] by registering at the following website: [email or web address]. By joining [email or web address]’s FollowMyHealth portal, you will also be able to view your health information using other applications (apps) compatible with our system.

## 2021-09-16 NOTE — ED ADULT NURSE NOTE - NS ED NOTE  TALK SOMEONE YN
On Call Note: pt noted increase in HR today . Wears a FitBit. She stays home all the time but went to store today. Checked her temp 99.2. Pt denies cough, body aches, CP or SOB. Has hx tachycardia. Takes Metoprolol 25 mg BID. She just took her second dose tonight. She has not been to Cardiology because she could not afford co-pay. She dose not have any significant symptoms that warrant going to ER at this time. I have asked her to try to see PCP tomorrow. If symptoms get worse tonight, call EMS and go to ER. Pt in agreement. ? Check thyroid function. No

## 2021-10-24 ENCOUNTER — EMERGENCY (EMERGENCY)
Facility: HOSPITAL | Age: 46
LOS: 1 days | Discharge: HOME | End: 2021-10-24
Attending: EMERGENCY MEDICINE
Payer: COMMERCIAL

## 2021-10-24 VITALS
RESPIRATION RATE: 18 BRPM | SYSTOLIC BLOOD PRESSURE: 120 MMHG | HEART RATE: 69 BPM | OXYGEN SATURATION: 100 % | DIASTOLIC BLOOD PRESSURE: 84 MMHG | TEMPERATURE: 98 F

## 2021-10-24 VITALS
HEART RATE: 67 BPM | OXYGEN SATURATION: 99 % | SYSTOLIC BLOOD PRESSURE: 144 MMHG | RESPIRATION RATE: 16 BRPM | DIASTOLIC BLOOD PRESSURE: 83 MMHG | HEIGHT: 65 IN | TEMPERATURE: 98 F | WEIGHT: 169.98 LBS

## 2021-10-24 PROCEDURE — 99284 EMERGENCY DEPT VISIT MOD MDM: CPT

## 2021-10-24 PROCEDURE — 99284 EMERGENCY DEPT VISIT MOD MDM: CPT | Mod: 25

## 2021-10-24 PROCEDURE — 71250 CT THORAX DX C-: CPT | Mod: MA

## 2021-10-24 PROCEDURE — 71250 CT THORAX DX C-: CPT | Mod: 26,MA

## 2021-10-24 RX ORDER — LIDOCAINE 4 G/100G
1 CREAM TOPICAL ONCE
Refills: 0 | Status: COMPLETED | OUTPATIENT
Start: 2021-10-24 | End: 2021-10-24

## 2021-10-24 RX ORDER — OXYCODONE HYDROCHLORIDE 5 MG/1
5 TABLET ORAL ONCE
Refills: 0 | Status: DISCONTINUED | OUTPATIENT
Start: 2021-10-24 | End: 2021-10-24

## 2021-10-24 RX ORDER — OXYCODONE HYDROCHLORIDE 5 MG/1
1 TABLET ORAL
Qty: 12 | Refills: 0
Start: 2021-10-24 | End: 2021-10-26

## 2021-10-24 RX ORDER — IBUPROFEN 200 MG
600 TABLET ORAL ONCE
Refills: 0 | Status: COMPLETED | OUTPATIENT
Start: 2021-10-24 | End: 2021-10-24

## 2021-10-24 RX ORDER — ACETAMINOPHEN 500 MG
650 TABLET ORAL ONCE
Refills: 0 | Status: COMPLETED | OUTPATIENT
Start: 2021-10-24 | End: 2021-10-24

## 2021-10-24 RX ADMIN — LIDOCAINE 1 PATCH: 4 CREAM TOPICAL at 21:05

## 2021-10-24 RX ADMIN — Medication 600 MILLIGRAM(S): at 21:06

## 2021-10-24 RX ADMIN — Medication 650 MILLIGRAM(S): at 21:06

## 2021-10-24 RX ADMIN — OXYCODONE HYDROCHLORIDE 5 MILLIGRAM(S): 5 TABLET ORAL at 18:50

## 2021-10-24 NOTE — ED PROVIDER NOTE - PROGRESS NOTE DETAILS
incentive spirometer [id number] went over results, feeling somewhat better but still in pain, went over anticipatory guidance, pain control regimen and follow up. went over how to use Incentive spirometry and how often as well as return precautions. to follow up PMD later this week - Radha Adkins PA-C

## 2021-10-24 NOTE — ED PROVIDER NOTE - CONSTITUTIONAL, MLM
normal... Well appearing, awake, alert, oriented to person, place, time/situation and clearly in pain

## 2021-10-24 NOTE — ED PROVIDER NOTE - NSFOLLOWUPINSTRUCTIONS_ED_ALL_ED_FT
Follow up with your Primary Care Physician within the next 2-3 days  Bring a copy of your test results with you to your appointment  Continue your current medication regimen  Return to the Emergency Room if you experience new or worsening symptoms  Take Tylenol 650mg every 6 hrs as needed for pain.  Take Motrin 400-600mg every 6 hrs as needed for pain. Take with food   Oxycodone 5mg every 6 hours as needed for pain. Do not drive or consume alcohol while taking.   Apply lidoderm patch to the area of maximal pain 1x per day, this can be purchased over the counter    You were not found to have rib fractures however rib contusions and fractures are managed the same way. This includes pain control and prevention of pneumonia.    A rib fracture is a break or crack in one of the bones of the ribs. The ribs are a group of long, curved bones that wrap around your chest and attach to your spine. A broken or cracked rib is often painful, but most do not cause other problems and heal on their own over time. Symptoms include pain when you breath or cough or pain when pressing over the area. Breathing exercises will help keep your lungs inflated and prevent lung collapse or infection.    SEEK IMMEDIATE MEDICAL CARE IF YOU HAVE ANY OF THE FOLLOWING SYMPTOMS: fever, shortness of breath, coughing up blood, abdominal pain, nausea or vomiting, pain not controlled with medications.

## 2021-10-24 NOTE — ED PROVIDER NOTE - OBJECTIVE STATEMENT
47 y/o female hx ptx, quit smoking a few months ago presents to the ED for evaluation of left sided posterior rib pain following a fall 3 days ago. reports going to  and had negative xray. worsening sob since the event.. no headstrike or loc. no other complaints.

## 2021-10-24 NOTE — ED ADULT NURSE REASSESSMENT NOTE - NS ED NURSE REASSESS COMMENT FT1
Received report from VERONICA Buchanan. Pt is awake and alert, resting comfortably in stretcher, speaking in full coherent sentences. Pt denies CP, SOB, fevers, chills, N/V/D, HA, vision changes. Call bell within reach, comfort & safety provided.

## 2021-10-24 NOTE — ED ADULT NURSE NOTE - OBJECTIVE STATEMENT
46F comes to ED s/p trip and fall down 10 steps Thursday (3 days ago). States steps were wood and she landed on tile floor. She denies LOC, head injury, neck pain. She went to urgent care after fall and was told no fracture. Symptoms persisted causing her to come to ED. States she is feeling short of breath with pain on inspiration. She has PMH pneumothorax on right side. On exam, tenderness and bruising to left posterior ribs, lungs clear, no neck tenderness, no further injuries. Denies anticoagulation. A&Ox3.

## 2021-10-24 NOTE — ED PROVIDER NOTE - CARDIAC, MLM
Normal rate, regular rhythm.  Heart sounds S1, S2.  No murmurs, rubs or gallops. left posterior chest wall with ecchymosis and significant ttp

## 2021-10-24 NOTE — ED PROVIDER NOTE - ATTENDING CONTRIBUTION TO CARE
Fall Thursday onto L posterior ribcage on stairs, having ongoing pain in chest wall, denying change in color of urine, is having discomfort with breathing.  Pain persisting despite motrin.  On exam patient with contusion along L lower ribs without crepitus or obvious stepoffs, lungs clear, is splinting during breathign.  Plan for CT chest to evaluate for rib fracture, treat as same, UA to screen for renal injury.

## 2022-01-26 NOTE — ED ADULT TRIAGE NOTE - PATIENT ON (OXYGEN DELIVERY METHOD)
room air Skyrizi Counseling: I discussed with the patient the risks of risankizumab-rzaa including but not limited to immunosuppression, and serious infections.  The patient understands that monitoring is required including a PPD at baseline and must alert us or the primary physician if symptoms of infection or other concerning signs are noted.

## 2022-02-07 NOTE — PATIENT PROFILE ADULT. - NS PRO PT REFERRAL QUES 2 YN
no
Bill For Surgical Tray: no
Billing Type: Third-Party Bill
Expected Date Of Service: 02/07/2022
Performing Laboratory: 0

## 2023-02-08 ENCOUNTER — APPOINTMENT (OUTPATIENT)
Dept: ORTHOPEDIC SURGERY | Facility: CLINIC | Age: 48
End: 2023-02-08

## 2023-02-14 NOTE — ED ADULT NURSE NOTE - CARDIO WDL
Patient wants to cancel his echo test that is on 3/01/23/ please call using  service.   Normal rate, regular rhythm, normal S1, S2 heart sounds heard.

## 2023-03-07 ENCOUNTER — APPOINTMENT (OUTPATIENT)
Dept: ORTHOPEDIC SURGERY | Facility: CLINIC | Age: 48
End: 2023-03-07

## 2023-04-07 ENCOUNTER — APPOINTMENT (OUTPATIENT)
Dept: ORTHOPEDIC SURGERY | Facility: CLINIC | Age: 48
End: 2023-04-07
Payer: COMMERCIAL

## 2023-04-07 DIAGNOSIS — G56.00 CARPAL TUNNEL SYNDROME, UNSPECIFIED UPPER LIMB: ICD-10-CM

## 2023-04-07 DIAGNOSIS — M18.9 OSTEOARTHRITIS OF FIRST CARPOMETACARPAL JOINT, UNSPECIFIED: ICD-10-CM

## 2023-04-07 PROCEDURE — 99203 OFFICE O/P NEW LOW 30 MIN: CPT | Mod: 25

## 2023-04-07 PROCEDURE — 73130 X-RAY EXAM OF HAND: CPT | Mod: 50

## 2023-04-07 PROCEDURE — 73110 X-RAY EXAM OF WRIST: CPT | Mod: 50

## 2023-04-10 NOTE — HISTORY OF PRESENT ILLNESS
[7] : 7 [Burning] : burning [Dull/Aching] : dull/aching [Sharp] : sharp [Shooting] : shooting [Stabbing] : stabbing [Throbbing] : throbbing [Intermittent] : intermittent [Meds] : meds [Ice] : ice [Heat] : heat [Bending forward] : bending forward [Extending back] : extending back [de-identified] : 4/7/23: 48 y/o RHD F presenting with one year of b/l hand pain and paresthesias.  The patient reports numbness in the fingertips of all fingers R>L. + Nocturnal symptoms.  She notes weakness in the hand and frequently dropping objects.  The patient also notes pain at the base of her thumb, worse with activities including grasp, right > left. 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  No injuries previously.  This is her first evaluation for this problem.  She has not had any previous treatments [] : no [FreeTextEntry5] : ROSARIO 47 year F is here for Bilateral hands, approx. 1 year ago the pain started is having swelling  and numbness, is having weakness and is dropping objects.radiating pain from the Bilateral Wrist to hand, does states the Right Wrist is worse but both equally have pain.States she workers at school kitchen and [email or web address] lifting objects

## 2023-04-10 NOTE — IMAGING
[de-identified] : Full/painless neck ROM\par \par Bilateral hands\par Skin atraumatic\par No atrophy\par Full painless elbow range of motion\par Full finger range of motion\par No palpable subluxation of the ulnar nerve\par SILT in radial/ulnar distributions, diminished in median distribution\par + AIN/PIN/Ulnar n.\par - Tinel at wrist\par - Tinel at elbow and Guyon's canal\par + Phalen's\par + CMC grind

## 2023-04-10 NOTE — DISCUSSION/SUMMARY
[Medication Risks Reviewed] : Medication risks reviewed [Surgical risks reviewed] : Surgical risks reviewed [de-identified] : - We briefly reviewed the treatment options for both basilar joint arthritis as well as carpal tunnel syndrome\par - We discussed the role for immobilization, steroid injections, physical therapy and ultimately operative treatment\par - We will obtain NCS/ EMG\par - Recommend thumb abduction neoprene brace\par - Follow-up after study

## 2023-07-21 ENCOUNTER — APPOINTMENT (OUTPATIENT)
Dept: ORTHOPEDIC SURGERY | Facility: CLINIC | Age: 48
End: 2023-07-21

## 2025-01-23 ENCOUNTER — APPOINTMENT (OUTPATIENT)
Dept: ORTHOPEDIC SURGERY | Facility: CLINIC | Age: 50
End: 2025-01-23
Payer: COMMERCIAL

## 2025-01-23 DIAGNOSIS — M51.369: ICD-10-CM

## 2025-01-23 DIAGNOSIS — M54.16 RADICULOPATHY, LUMBAR REGION: ICD-10-CM

## 2025-01-23 DIAGNOSIS — M43.16 SPONDYLOLISTHESIS, LUMBAR REGION: ICD-10-CM

## 2025-01-23 PROCEDURE — 72170 X-RAY EXAM OF PELVIS: CPT

## 2025-01-23 PROCEDURE — 72110 X-RAY EXAM L-2 SPINE 4/>VWS: CPT

## 2025-01-23 PROCEDURE — 99214 OFFICE O/P EST MOD 30 MIN: CPT

## 2025-01-23 PROCEDURE — 99204 OFFICE O/P NEW MOD 45 MIN: CPT

## 2025-01-23 RX ORDER — MELOXICAM 7.5 MG/1
7.5 TABLET ORAL DAILY
Qty: 60 | Refills: 0 | Status: ACTIVE | COMMUNITY
Start: 2025-01-23 | End: 1900-01-01

## 2025-01-23 RX ORDER — GABAPENTIN 100 MG/1
100 CAPSULE ORAL
Qty: 60 | Refills: 2 | Status: ACTIVE | COMMUNITY
Start: 2025-01-23 | End: 1900-01-01

## 2025-02-01 ENCOUNTER — EMERGENCY (EMERGENCY)
Facility: HOSPITAL | Age: 50
LOS: 1 days | Discharge: HOME | End: 2025-02-01
Attending: STUDENT IN AN ORGANIZED HEALTH CARE EDUCATION/TRAINING PROGRAM
Payer: COMMERCIAL

## 2025-02-01 VITALS
RESPIRATION RATE: 18 BRPM | TEMPERATURE: 98 F | SYSTOLIC BLOOD PRESSURE: 113 MMHG | HEART RATE: 82 BPM | OXYGEN SATURATION: 96 % | DIASTOLIC BLOOD PRESSURE: 57 MMHG

## 2025-02-01 VITALS
TEMPERATURE: 98 F | HEART RATE: 78 BPM | HEIGHT: 65 IN | WEIGHT: 139.99 LBS | SYSTOLIC BLOOD PRESSURE: 121 MMHG | RESPIRATION RATE: 17 BRPM | DIASTOLIC BLOOD PRESSURE: 76 MMHG | OXYGEN SATURATION: 97 %

## 2025-02-01 LAB
ALBUMIN SERPL ELPH-MCNC: 4.2 G/DL — SIGNIFICANT CHANGE UP (ref 3.3–5)
ALP SERPL-CCNC: 64 U/L — SIGNIFICANT CHANGE UP (ref 40–120)
ALT FLD-CCNC: 26 U/L — SIGNIFICANT CHANGE UP (ref 10–45)
ANION GAP SERPL CALC-SCNC: 15 MMOL/L — SIGNIFICANT CHANGE UP (ref 5–17)
APTT BLD: 33.3 SEC — SIGNIFICANT CHANGE UP (ref 24.5–35.6)
AST SERPL-CCNC: 32 U/L — SIGNIFICANT CHANGE UP (ref 10–40)
BASOPHILS # BLD AUTO: 0.05 K/UL — SIGNIFICANT CHANGE UP (ref 0–0.2)
BASOPHILS NFR BLD AUTO: 0.9 % — SIGNIFICANT CHANGE UP (ref 0–2)
BILIRUB SERPL-MCNC: 0.4 MG/DL — SIGNIFICANT CHANGE UP (ref 0.2–1.2)
BUN SERPL-MCNC: 25 MG/DL — HIGH (ref 7–23)
CALCIUM SERPL-MCNC: 9.2 MG/DL — SIGNIFICANT CHANGE UP (ref 8.4–10.5)
CHLORIDE SERPL-SCNC: 104 MMOL/L — SIGNIFICANT CHANGE UP (ref 96–108)
CO2 SERPL-SCNC: 21 MMOL/L — LOW (ref 22–31)
CREAT SERPL-MCNC: 0.69 MG/DL — SIGNIFICANT CHANGE UP (ref 0.5–1.3)
CRP SERPL-MCNC: <3 MG/L — SIGNIFICANT CHANGE UP (ref 0–4)
EGFR: 106 ML/MIN/1.73M2 — SIGNIFICANT CHANGE UP
EOSINOPHIL # BLD AUTO: 0.15 K/UL — SIGNIFICANT CHANGE UP (ref 0–0.5)
EOSINOPHIL NFR BLD AUTO: 2.6 % — SIGNIFICANT CHANGE UP (ref 0–6)
GLUCOSE SERPL-MCNC: 98 MG/DL — SIGNIFICANT CHANGE UP (ref 70–99)
HCT VFR BLD CALC: 36.9 % — SIGNIFICANT CHANGE UP (ref 34.5–45)
HGB BLD-MCNC: 12.6 G/DL — SIGNIFICANT CHANGE UP (ref 11.5–15.5)
IMM GRANULOCYTES NFR BLD AUTO: 0.2 % — SIGNIFICANT CHANGE UP (ref 0–0.9)
INR BLD: 0.98 RATIO — SIGNIFICANT CHANGE UP (ref 0.85–1.16)
LYMPHOCYTES # BLD AUTO: 2.24 K/UL — SIGNIFICANT CHANGE UP (ref 1–3.3)
LYMPHOCYTES # BLD AUTO: 38.1 % — SIGNIFICANT CHANGE UP (ref 13–44)
MCHC RBC-ENTMCNC: 32.2 PG — SIGNIFICANT CHANGE UP (ref 27–34)
MCHC RBC-ENTMCNC: 34.1 G/DL — SIGNIFICANT CHANGE UP (ref 32–36)
MCV RBC AUTO: 94.4 FL — SIGNIFICANT CHANGE UP (ref 80–100)
MONOCYTES # BLD AUTO: 0.44 K/UL — SIGNIFICANT CHANGE UP (ref 0–0.9)
MONOCYTES NFR BLD AUTO: 7.5 % — SIGNIFICANT CHANGE UP (ref 2–14)
NEUTROPHILS # BLD AUTO: 2.99 K/UL — SIGNIFICANT CHANGE UP (ref 1.8–7.4)
NEUTROPHILS NFR BLD AUTO: 50.7 % — SIGNIFICANT CHANGE UP (ref 43–77)
NRBC # BLD: 0 /100 WBCS — SIGNIFICANT CHANGE UP (ref 0–0)
NRBC BLD-RTO: 0 /100 WBCS — SIGNIFICANT CHANGE UP (ref 0–0)
PLATELET # BLD AUTO: 214 K/UL — SIGNIFICANT CHANGE UP (ref 150–400)
POTASSIUM SERPL-MCNC: 3.6 MMOL/L — SIGNIFICANT CHANGE UP (ref 3.5–5.3)
POTASSIUM SERPL-SCNC: 3.6 MMOL/L — SIGNIFICANT CHANGE UP (ref 3.5–5.3)
PROT SERPL-MCNC: 6.3 G/DL — SIGNIFICANT CHANGE UP (ref 6–8.3)
PROTHROM AB SERPL-ACNC: 11.3 SEC — SIGNIFICANT CHANGE UP (ref 9.9–13.4)
RBC # BLD: 3.91 M/UL — SIGNIFICANT CHANGE UP (ref 3.8–5.2)
RBC # FLD: 12 % — SIGNIFICANT CHANGE UP (ref 10.3–14.5)
SODIUM SERPL-SCNC: 140 MMOL/L — SIGNIFICANT CHANGE UP (ref 135–145)
WBC # BLD: 5.88 K/UL — SIGNIFICANT CHANGE UP (ref 3.8–10.5)
WBC # FLD AUTO: 5.88 K/UL — SIGNIFICANT CHANGE UP (ref 3.8–10.5)

## 2025-02-01 PROCEDURE — 93970 EXTREMITY STUDY: CPT

## 2025-02-01 PROCEDURE — 99284 EMERGENCY DEPT VISIT MOD MDM: CPT

## 2025-02-01 PROCEDURE — 84100 ASSAY OF PHOSPHORUS: CPT

## 2025-02-01 PROCEDURE — 85610 PROTHROMBIN TIME: CPT

## 2025-02-01 PROCEDURE — 86140 C-REACTIVE PROTEIN: CPT

## 2025-02-01 PROCEDURE — 85730 THROMBOPLASTIN TIME PARTIAL: CPT

## 2025-02-01 PROCEDURE — 96374 THER/PROPH/DIAG INJ IV PUSH: CPT

## 2025-02-01 PROCEDURE — 96375 TX/PRO/DX INJ NEW DRUG ADDON: CPT

## 2025-02-01 PROCEDURE — 99284 EMERGENCY DEPT VISIT MOD MDM: CPT | Mod: 25

## 2025-02-01 PROCEDURE — 85652 RBC SED RATE AUTOMATED: CPT

## 2025-02-01 PROCEDURE — 83735 ASSAY OF MAGNESIUM: CPT

## 2025-02-01 PROCEDURE — 85025 COMPLETE CBC W/AUTO DIFF WBC: CPT

## 2025-02-01 PROCEDURE — 93970 EXTREMITY STUDY: CPT | Mod: 26

## 2025-02-01 PROCEDURE — 80053 COMPREHEN METABOLIC PANEL: CPT

## 2025-02-01 RX ORDER — FAMOTIDINE 10 MG/ML
20 INJECTION INTRAVENOUS ONCE
Refills: 0 | Status: COMPLETED | OUTPATIENT
Start: 2025-02-01 | End: 2025-02-01

## 2025-02-01 RX ORDER — DIPHENHYDRAMINE HCL 25 MG
25 CAPSULE ORAL ONCE
Refills: 0 | Status: COMPLETED | OUTPATIENT
Start: 2025-02-01 | End: 2025-02-01

## 2025-02-01 RX ORDER — ACETAMINOPHEN 160 MG/5ML
1000 SUSPENSION ORAL ONCE
Refills: 0 | Status: COMPLETED | OUTPATIENT
Start: 2025-02-01 | End: 2025-02-01

## 2025-02-01 RX ADMIN — FAMOTIDINE 20 MILLIGRAM(S): 10 INJECTION INTRAVENOUS at 21:00

## 2025-02-01 RX ADMIN — ACETAMINOPHEN 400 MILLIGRAM(S): 160 SUSPENSION ORAL at 20:46

## 2025-02-01 RX ADMIN — ACETAMINOPHEN 1000 MILLIGRAM(S): 160 SUSPENSION ORAL at 21:10

## 2025-02-01 RX ADMIN — Medication 25 MILLIGRAM(S): at 21:00

## 2025-02-01 NOTE — ED PROVIDER NOTE - OBJECTIVE STATEMENT
48yo female with PMHx of Former Smoker, COPD on inhaler, Right spontaneous pneumothorax, Chronic low back pain presents to ED with left leg pain, red and warmth since yesterday. Reports she noticed left calf pain, radiating to thigh and redness/warmth to thigh since yesterday. She also c/o right inner thigh cramping today. Denies recent injury. Denies fever, chills or recent cold symptoms. Denies sensory changes or weakness to extremities. Denies headache or neck apin. Denies CP/worsening SOB or abd pain. Denies N/V/D, Denies urinary or bowel problems.

## 2025-02-01 NOTE — ED ADULT NURSE NOTE - OBJECTIVE STATEMENT
48 y/o F presents to ED with c/o LLE pain. Per pt the pain started last night and progressively worsened throughout the day causing her to ambulate with a limp and unable to climb stairs. Pt a&ox3, VS see flow sheet. PMH COPD, Emphysema. Pt denies chest pain, n/v/d, blurred vision, headaches and blood thinners. Pt placed in position of comfort. Pt educated on call bell system and provided call bell. Bed in lowest position, wheels locked, appropriate side rails raised. Pt denies needs at this time.

## 2025-02-01 NOTE — ED PROVIDER NOTE - NSFOLLOWUPINSTRUCTIONS_ED_ALL_ED_FT
Please see the information of Allergic Reaction.    Hydrate.    Keep continue your current medications as prescribed.    Take Benadryl (2 tablets of 25mg every 8hours) as needed rash or itching with a caution of drowsiness: no drive or drink while taking Benadryl.  Take Pepcid 20mg daily as needed for rash or itching.    Follow up with your primary Dr. for reevaluation, call Monday for appointment.    Return for any concerns, fever, chills, numbness, weakness, chest pain, difficult breathing, or worsening symptoms.

## 2025-02-01 NOTE — ED ADULT NURSE NOTE - CHIEF COMPLAINT QUOTE
pt c/o L lower leg pain, swelling, redness, and warm since last night   pt denies leg trauma/falls, wounds to leg  +calf pain

## 2025-02-01 NOTE — ED ADULT TRIAGE NOTE - CHIEF COMPLAINT QUOTE
pt c/o L lower leg pain, swelling, redness, and warm to touch since last night   pt denies leg trauma/falls, wounds to leg  +calf pain pt c/o L lower leg pain, swelling, redness, and warm since last night   pt denies leg trauma/falls, wounds to leg  +calf pain

## 2025-02-01 NOTE — ED PROVIDER NOTE - PROGRESS NOTE DETAILS
Pt went to US. Pt states feeling much improved now. B/L leg eryth resolved. +Ambulates with steady gait without pain. Neuro- intact.

## 2025-02-01 NOTE — ED PROVIDER NOTE - CLINICAL SUMMARY MEDICAL DECISION MAKING FREE TEXT BOX
Attending Lizeth: 50 y/o F w/ PMH of COPD, former smoker presenting w/ leg pain. Seen w/ family. Reports developed L leg pain yesterday. Denies any falls or trauma. Pain mainly behind the L knee. Also reports developed redness to both legs as well. Endorsing a dry, itchy sensation to the legs. No known allergies. Denies any new foods, lotions, detergents, soaps, clothes, travel. Denies fevers, chills, headache, dizziness, blurred vision, chest pain, cough, shortness of breath, abdominal pain, n/v/d/c, urinary symptoms. Pt overall no acute distress. Head NCAT. Lungs scant exp wheeze b/l. HR regular. Abd nondistended/soft/nontender. No CVA tenderness. Non focal neuro exam. Calm and cooperative. LLE: NVI,  tender posterior knee. Erythematous, blanchable rash across BLE. Pt overall well appearing. Will obtain duplex to r/o DVT. Cellulitis would be atypical given b/l presentation. Possible allergic reaction? Plan for labs, imaging, meds. Pt denying SOB. offered albuterol for wheezing, however pt declined as she feels like she is breathing normal and reports always has some wheeze due to her COPD. Will reassess the need for additional interventions as clinically warranted. Refer to any progress notes for updates on clinical course and as a continuation of this MDM.

## 2025-02-01 NOTE — ED PROVIDER NOTE - PATIENT PORTAL LINK FT
You can access the FollowMyHealth Patient Portal offered by [facility] by registering at the following website: [email or web address]. By joining [employer]’s FollowMyHealth portal, you will also be able to view your health information using other applications (apps) compatible with our system.

## 2025-02-01 NOTE — ED ADULT NURSE NOTE - NSFALLRISKINTERV_ED_ALL_ED
Assistance OOB with selected safe patient handling equipment if applicable/Assistance with ambulation/Communicate fall risk and risk factors to all staff, patient, and family/Monitor gait and stability/Provide visual cue: yellow wristband, yellow gown, etc/Reinforce activity limits and safety measures with patient and family/Call bell, personal items and telephone in reach/Instruct patient to call for assistance before getting out of bed/chair/stretcher/Non-slip footwear applied when patient is off stretcher/[place] to call system/Physically safe environment - no spills, clutter or unnecessary equipment/Purposeful Proactive Rounding/Room/bathroom lighting operational, light cord in reach

## 2025-02-01 NOTE — ED PROVIDER NOTE - ATTENDING APP SHARED VISIT CONTRIBUTION OF CARE
Attending Lizeth: I performed a face to face evaluation of the patient and obtained a history as well as performed a physical exam. I have discussed their management with the LAWANDA. I have reviewed the LAWANDA note and agree with the documented findings and plan of care, except as noted. This was a shared visit with an LAWANDA. I reviewed and verified the documentation and independently performed my own history/exam/medical decision making. My medical decision making and observations are found above. Please refer to any progress notes for updates on clinical course. My notes supersedes the above LAWANDA note in case of discrepancy

## 2025-02-01 NOTE — ED PROVIDER NOTE - PHYSICAL EXAMINATION
NAD. VSS. Afebrile. Neck supple. Lungs wheezing bilaterally. ABD soft, non tender. No C/T spinal tender. +Generalized low lumbar tender. +Medial left calf tender with localized swelling, tender, eryth, and warmth to generalized ant/medial thigh bilaterally. +B/L ankle/foot eryth and warmth without obvious swelling. B/L hand bilaterally mild swelling, eryth and warmth. N/V- intact. No focal neuro deficit.

## 2025-02-02 LAB — ERYTHROCYTE [SEDIMENTATION RATE] IN BLOOD: 2 MM/HR — SIGNIFICANT CHANGE UP (ref 0–20)

## 2025-03-27 ENCOUNTER — APPOINTMENT (OUTPATIENT)
Dept: ORTHOPEDIC SURGERY | Facility: CLINIC | Age: 50
End: 2025-03-27
Payer: COMMERCIAL

## 2025-03-27 DIAGNOSIS — M54.16 RADICULOPATHY, LUMBAR REGION: ICD-10-CM

## 2025-03-27 DIAGNOSIS — M51.369: ICD-10-CM

## 2025-03-27 PROCEDURE — 99214 OFFICE O/P EST MOD 30 MIN: CPT

## 2025-04-04 ENCOUNTER — APPOINTMENT (OUTPATIENT)
Dept: MRI IMAGING | Facility: CLINIC | Age: 50
End: 2025-04-04

## 2025-04-24 ENCOUNTER — APPOINTMENT (OUTPATIENT)
Dept: ORTHOPEDIC SURGERY | Facility: CLINIC | Age: 50
End: 2025-04-24

## 2025-08-05 ENCOUNTER — NON-APPOINTMENT (OUTPATIENT)
Age: 50
End: 2025-08-05

## 2025-08-07 ENCOUNTER — APPOINTMENT (OUTPATIENT)
Dept: ORTHOPEDIC SURGERY | Facility: CLINIC | Age: 50
End: 2025-08-07
Payer: COMMERCIAL

## 2025-08-07 DIAGNOSIS — M54.16 RADICULOPATHY, LUMBAR REGION: ICD-10-CM

## 2025-08-07 DIAGNOSIS — M51.369: ICD-10-CM

## 2025-08-07 PROCEDURE — 99214 OFFICE O/P EST MOD 30 MIN: CPT

## 2025-08-14 ENCOUNTER — NON-APPOINTMENT (OUTPATIENT)
Age: 50
End: 2025-08-14

## 2025-08-20 ENCOUNTER — APPOINTMENT (OUTPATIENT)
Dept: MRI IMAGING | Facility: CLINIC | Age: 50
End: 2025-08-20
Payer: COMMERCIAL

## 2025-08-20 PROCEDURE — 72148 MRI LUMBAR SPINE W/O DYE: CPT

## 2025-09-11 ENCOUNTER — APPOINTMENT (OUTPATIENT)
Dept: ORTHOPEDIC SURGERY | Facility: CLINIC | Age: 50
End: 2025-09-11